# Patient Record
Sex: MALE | Race: BLACK OR AFRICAN AMERICAN | Employment: UNEMPLOYED | URBAN - METROPOLITAN AREA
[De-identification: names, ages, dates, MRNs, and addresses within clinical notes are randomized per-mention and may not be internally consistent; named-entity substitution may affect disease eponyms.]

---

## 2017-01-09 RX ORDER — METOPROLOL SUCCINATE 100 MG/1
100 TABLET, EXTENDED RELEASE ORAL DAILY
Qty: 90 TAB | Refills: 3 | Status: SHIPPED | OUTPATIENT
Start: 2017-01-09 | End: 2020-10-12 | Stop reason: DRUGHIGH

## 2017-01-09 RX ORDER — LISINOPRIL 20 MG/1
20 TABLET ORAL DAILY
Qty: 90 TAB | Refills: 3 | Status: SHIPPED | OUTPATIENT
Start: 2017-01-09 | End: 2017-04-03 | Stop reason: ALTCHOICE

## 2017-01-10 RX ORDER — AMLODIPINE BESYLATE 10 MG/1
10 TABLET ORAL DAILY
Qty: 90 TAB | Refills: 0 | Status: SHIPPED | OUTPATIENT
Start: 2017-01-10 | End: 2017-04-03 | Stop reason: ALTCHOICE

## 2017-04-03 ENCOUNTER — OFFICE VISIT (OUTPATIENT)
Dept: INTERNAL MEDICINE CLINIC | Age: 32
End: 2017-04-03

## 2017-04-03 VITALS
TEMPERATURE: 98 F | SYSTOLIC BLOOD PRESSURE: 120 MMHG | HEIGHT: 66 IN | OXYGEN SATURATION: 98 % | DIASTOLIC BLOOD PRESSURE: 74 MMHG | WEIGHT: 169 LBS | BODY MASS INDEX: 27.16 KG/M2 | RESPIRATION RATE: 20 BRPM | HEART RATE: 83 BPM

## 2017-04-03 DIAGNOSIS — F41.9 ANXIETY DISORDER, UNSPECIFIED TYPE: ICD-10-CM

## 2017-04-03 DIAGNOSIS — I61.8 OTHER LEFT-SIDED NONTRAUMATIC INTRACEREBRAL HEMORRHAGE (HCC): ICD-10-CM

## 2017-04-03 DIAGNOSIS — I10 ESSENTIAL HYPERTENSION: Primary | ICD-10-CM

## 2017-04-03 RX ORDER — ALPRAZOLAM 0.25 MG/1
0.25 TABLET ORAL
Qty: 30 TAB | Refills: 0 | Status: SHIPPED | OUTPATIENT
Start: 2017-04-03 | End: 2017-10-03 | Stop reason: ALTCHOICE

## 2017-04-03 RX ORDER — HYDRALAZINE HYDROCHLORIDE 50 MG/1
50 TABLET, FILM COATED ORAL 4 TIMES DAILY
Qty: 120 TAB | Refills: 2 | Status: SHIPPED | OUTPATIENT
Start: 2017-04-03 | End: 2017-07-02

## 2017-04-03 RX ORDER — LISINOPRIL 20 MG/1
20 TABLET ORAL DAILY
Qty: 90 TAB | Refills: 3 | Status: CANCELLED | OUTPATIENT
Start: 2017-04-03

## 2017-04-03 RX ORDER — LISINOPRIL 20 MG/1
20 TABLET ORAL DAILY
Qty: 90 TAB | Refills: 0 | Status: SHIPPED | OUTPATIENT
Start: 2017-04-03 | End: 2017-07-08 | Stop reason: SDUPTHER

## 2017-04-03 RX ORDER — AMLODIPINE BESYLATE 5 MG/1
5 TABLET ORAL DAILY
Qty: 90 TAB | Refills: 0 | Status: SHIPPED | OUTPATIENT
Start: 2017-04-03 | End: 2017-07-08 | Stop reason: SDUPTHER

## 2017-04-03 RX ORDER — CLONIDINE 0.2 MG/24H
1 PATCH, EXTENDED RELEASE TRANSDERMAL
Qty: 4 PATCH | Refills: 3 | Status: SHIPPED | OUTPATIENT
Start: 2017-04-03 | End: 2017-07-03 | Stop reason: SDUPTHER

## 2017-04-03 RX ORDER — ALPRAZOLAM 0.5 MG/1
0.5 TABLET ORAL
Qty: 30 TAB | Refills: 0 | Status: CANCELLED | OUTPATIENT
Start: 2017-04-03

## 2017-04-03 RX ORDER — CLONIDINE 0.3 MG/24H
1 PATCH, EXTENDED RELEASE TRANSDERMAL
Qty: 4 PATCH | Refills: 1 | Status: CANCELLED | OUTPATIENT
Start: 2017-04-03

## 2017-04-03 NOTE — PROGRESS NOTES
Chief Complaint   Patient presents with    Follow-up     three month follow up. 1. Have you been to the ER, urgent care clinic since your last visit? Hospitalized since your last visit? No    2. Have you seen or consulted any other health care providers outside of the [address] [place] [address] since your last visit? Include any pap smears or colon screening.  No

## 2017-04-03 NOTE — PROGRESS NOTES
Written by Troy Verde, as dictated by Dr. Berkley Larkin MD.    Alberto Mccray is a 27 y.o. male. HPI  The patient comes in today for a follow up. He has finished PT and he is walking around inside without his cane to help practice. His BP looks very good today at 120/74. He is taking all of his medications, but he no longer has insurance at this time. His insurance was through work, but it ran out and he was told he can come back for his job once he is physically stable. He no longer has a peg tube. He can eat fine and he does not have any problem with eating or difficulty swallowing. He is taking the Xanax once a day for his anxiety. He is using the clonidine patch and he changes it once a week. He is compliant on hydralazine QID. Patient Active Problem List   Diagnosis Code    [facility] I61.9    CVA (cerebrovascular accident due to intracerebral hemorrhage) ([facility] I61.9    Counseling regarding advanced directives and goals of care Z71.89    Physical debility R53.81    Anxiety F41.9    Bipolar 1 disorder ([facility] F31.9        Current Outpatient Prescriptions on File Prior to Visit   Medication Sig Dispense Refill    metoprolol succinate (TOPROL-XL) 100 mg tablet Take 1 Tab by mouth daily. 90 Tab 3    lisinopril (PRINIVIL, ZESTRIL) 20 mg tablet 1 Tab by Per G Tube route daily. 90 Tab 3    ALPRAZolam (XANAX) 0.5 mg tablet Take 0.5 mg by mouth nightly as needed.  baclofen (LIORESAL) 10 mg tablet Take 10 mg by mouth every six (6) hours as needed.  cloNIDine (CATAPRES) 0.3 mg/24 hr 1 Patch by TransDERmal route every three (3) days. 1    hydrALAZINE (APRESOLINE) 100 mg tablet 0.5 Tabs by Per G Tube route four (4) times daily. Indications: Hypertension 120 Tab 2    ergocalciferol (ERGOCALCIFEROL) 50,000 unit capsule Take 1 Cap by mouth every seven (7) days.  12 Cap 1     No current facility-administered medications on file prior to visit. No Known Allergies    Past Medical History:   Diagnosis Date    Anxiety     Chronic kidney disease     Hypertension     Stroke [facility])        Past Surgical History:   Procedure Laterality Date    HX ORTHOPAEDIC      rupture tendon    PLACE PERCUT GASTROSTOMY TUBE  1/27/2016            Family History   Problem Relation Age of Onset    Lupus Mother     Cancer Paternal Uncle     Stroke Paternal Grandmother        Social History     Social History    Marital status: SINGLE     Spouse name: N/A    Number of children: N/A    Years of education: N/A     Occupational History    Not on file. Social History Main Topics    Smoking status: Current Every Day Smoker     Packs/day: 1.00     Years: 9.00    Smokeless tobacco: Never Used    Alcohol use 1.5 oz/week     3 Standard drinks or equivalent per week      Comment: occasional    Drug use: No    Sexual activity: Yes     Other Topics Concern    Not on file     Social History Narrative           Review of Systems   Constitutional: Negative for malaise/fatigue. HENT: Negative for congestion. Respiratory: Negative for cough and wheezing. Cardiovascular: Negative for chest pain and palpitations. Musculoskeletal: Negative for joint pain. Neurological: Positive for focal weakness. Negative for dizziness, tingling, sensory change, weakness and headaches. Psychiatric/Behavioral: The patient is nervous/anxious. Visit Vitals    /74    Pulse 83    Temp 98 °F (36.7 °C) (Oral)    Resp 20    Ht 5' 6\" (1.676 m)    Wt 169 lb (76.7 kg)    SpO2 98%    BMI 27.28 kg/m2     Physical Exam   Constitutional: He is oriented to person, place, and time. He appears well-nourished. No distress. HENT:   Right Ear: External ear normal.   Left Ear: External ear normal.   Mouth/Throat: Oropharynx is clear and moist.   Eyes: Conjunctivae and EOM are normal.   Neck: Normal range of motion. Neck supple.    Cardiovascular: Normal rate and regular rhythm. Pulmonary/Chest: Effort normal and breath sounds normal. He has no wheezes. Abdominal: Soft. Bowel sounds are normal.   Neurological: He is alert and oriented to person, place, and time. Coordination abnormal.   + residual weakness   Psychiatric: He has a normal mood and affect. Nursing note and vitals reviewed. ASSESSMENT and PLAN    ICD-10-CM ICD-9-CM    1. Essential hypertension I10 401.9 amLODIPine (NORVASC) 5 mg tablet sent to pharmacy. lisinopril (PRINIVIL, ZESTRIL) 20 mg tablet sent to pharmacy. cloNIDine (CATAPRES) 0.2 mg/24 hr patch sent to pharmacy. hydrALAZINE (APRESOLINE) 50 mg tablet sent to pharmacy. I am going to lower his amlodipine from 10 mg to 5 mg and I want him to continue checking his BP everyday. I discussed that I am going to try to cut down on some of his medications. I decreased his clonidine patch dose as well. If his BP is over 150/90 then he needs to let me know. 2. Other left-sided nontraumatic intracerebral hemorrhage (HCC) I61.8 431 Follows with Dr. Elmer Cobb (neuro). 3. Anxiety disorder, unspecified type F41.9 300.00 ALPRAZolam (XANAX) 0.25 mg tablet script given to patient. I discussed that he does not need to take the xanax everyday, and he should just take it as needed. This plan was reviewed with the patient and patient agrees. All questions were answered. This scribe documentation was reviewed by me and accurately reflects the examination and decisions made by me. This note will not be viewable in [address]

## 2017-04-03 NOTE — MR AVS SNAPSHOT
Visit Information Date & Time Provider Department Dept. Phone Encounter #  
 4/3/2017 11:30 AM [facility] [phone] [id number] Upcoming Health Maintenance Date Due DTaP/Tdap/Td series (1 - Tdap) 3/17/2004 INFLUENZA AGE 9 TO ADULT 8/1/2016 Allergies as of 4/3/2017  Review Complete On: 4/3/2017 By: Madhav Wheat MD  
 No Known Allergies Current Immunizations  Never Reviewed Name Date Influenza Vaccine PF 2/4/2016  9:22 AM  
 Pneumococcal Polysaccharide (PPSV-23) 2/15/2016  1:15 PM  
  
 Not reviewed this visit You Were Diagnosed With   
  
 Codes Comments Essential hypertension    -  Primary ICD-10-CM: I10 
ICD-9-CM: 401.9 Other left-sided nontraumatic intracerebral hemorrhage (HCC)     ICD-10-CM: I61.8 ICD-9-CM: 178 Anxiety disorder, unspecified type     ICD-10-CM: F41.9 ICD-9-CM: 300.00 Vitals BP Pulse Temp Resp Height(growth percentile) Weight(growth percentile) 120/74 83 98 °F (36.7 °C) (Oral) 20 5' 6\" (1.676 m) 169 lb (76.7 kg) SpO2 BMI Smoking Status 98% 27.28 kg/m2 Current Every Day Smoker BMI and BSA Data Body Mass Index Body Surface Area  
 27.28 kg/m 2 1.89 m 2 Preferred Pharmacy Pharmacy Name Phone [facility]PH[facility]ACY [id number] [address] [phone] Your Updated Medication List  
  
   
This list is accurate as of: 4/3/17 12:00 PM.  Always use your most recent med list.  
  
  
  
  
 ALPRAZolam 0.25 mg tablet Commonly known as:  Eunice McClave Take 1 Tab by mouth nightly as needed for Anxiety for up to 30 doses. Max Daily Amount: 0.25 mg. amLODIPine 5 mg tablet Commonly known as:  Anya Medici Take 1 Tab by mouth daily for 90 days. baclofen 10 mg tablet Commonly known as:  LIORESAL Take 10 mg by mouth every six (6) hours as needed. cloNIDine 0.2 mg/24 hr patch Commonly known as:  CATAPRES  
 1 Patch by TransDERmal route every seven (7) days for 90 days. ergocalciferol 50,000 unit capsule Commonly known as:  ERGOCALCIFEROL Take 1 Cap by mouth every seven (7) days. hydrALAZINE 50 mg tablet Commonly known as:  APRESOLINE Take 1 Tab by mouth four (4) times daily for 90 days. lisinopril 20 mg tablet Commonly known as:  Iona Moulder Take 1 Tab by mouth daily for 90 days. metoprolol succinate 100 mg tablet Commonly known as:  TOPROL-XL Take 1 Tab by mouth daily. Prescriptions Printed Refills ALPRAZolam (XANAX) 0.25 mg tablet 0 Sig: Take 1 Tab by mouth nightly as needed for Anxiety for up to 30 doses. Max Daily Amount: 0.25 mg.  
 Class: Print Route: Oral  
  
Prescriptions Sent to Pharmacy Refills  
 amLODIPine (NORVASC) 5 mg tablet 0 Sig: Take 1 Tab by mouth daily for 90 days. Class: Normal  
 Pharmacy: [facility]/pharmacy #[id number] [facility], [address] Ph #: [phone] Route: Oral  
 lisinopril (PRINIVIL, ZESTRIL) 20 mg tablet 0 Sig: Take 1 Tab by mouth daily for 90 days. Class: Normal  
 Pharmacy: [facility]/pharmacy #[id number] [facility], [address] Ph #: [phone] Route: Oral  
 cloNIDine (CATAPRES) 0.2 mg/24 hr patch 3 Si Patch by TransDERmal route every seven (7) days for 90 days. Class: Normal  
 Pharmacy: [facility]/pharmacy #[id number] [facility], [address] Ph #: [phone] Route: TransDERmal  
 hydrALAZINE (APRESOLINE) 50 mg tablet 2 Sig: Take 1 Tab by mouth four (4) times daily for 90 days. Class: Normal  
 Pharmacy: [facility]/pharmacy #[id number] [facility], [address] Ph #: [phone] Route: Oral  
  
Introducing [facility]!    
 Lobito Aldridge introduces [facility] patient portal. Now you can access parts of your medical record, email your doctor's office, and request medication refills online. 1. In your internet browser, go to [email or web address] 2. Click on the First Time User? Click Here link in the Sign In box. You will see the New Member Sign Up page. 3. Enter your [email or web address] Access Code exactly as it appears below. You will not need to use this code after youve completed the sign-up process. If you do not sign up before the expiration date, you must request a new code. · [email or web address] Access Code: [email or web address] Expires: 7/2/2017 12:00 PM 
 
4. Enter the last four digits of your Social Security Number (xxxx) and Date of Birth (mm/dd/yyyy) as indicated and click Submit. You will be taken to the next sign-up page. 5. Create a [email or web address] ID. This will be your [email or web address] login ID and cannot be changed, so think of one that is secure and easy to remember. 6. Create a [email or web address] password. You can change your password at any time. 7. Enter your Password Reset Question and Answer. This can be used at a later time if you forget your password. 8. Enter your e-mail address. You will receive e-mail notification when new information is available in [address] 9. Click Sign Up. You can now view and download portions of your medical record. 10. Click the Download Summary menu link to download a portable copy of your medical information. If you have questions, please visit the Frequently Asked Questions section of the [email or web address] website. Remember, [email or web address] is NOT to be used for urgent needs. For medical emergencies, dial 911. Now available from your iPhone and Android! Please provide this summary of care documentation to your next provider. Your primary care clinician is listed as Rosemary Castro. If you have any questions after today's visit, please call [phone].

## 2017-04-13 RX ORDER — ERGOCALCIFEROL 1.25 MG/1
50000 CAPSULE ORAL
Qty: 12 CAP | Refills: 1 | Status: SHIPPED | OUTPATIENT
Start: 2017-04-13 | End: 2020-03-06 | Stop reason: ALTCHOICE

## 2017-05-22 DIAGNOSIS — F32.A DEPRESSION, UNSPECIFIED DEPRESSION TYPE: ICD-10-CM

## 2017-05-22 RX ORDER — ESCITALOPRAM OXALATE 10 MG/1
TABLET ORAL
Qty: 30 TAB | Refills: 0 | Status: SHIPPED | OUTPATIENT
Start: 2017-05-22 | End: 2017-07-03 | Stop reason: ALTCHOICE

## 2017-06-19 ENCOUNTER — HOSPITAL ENCOUNTER (EMERGENCY)
Age: 32
Discharge: HOME | End: 2017-06-19
Attending: EMERGENCY MEDICINE | Admitting: EMERGENCY MEDICINE
Payer: SELF-PAY

## 2017-06-19 ENCOUNTER — APPOINTMENT (OUTPATIENT)
Dept: CT IMAGING | Age: 32
End: 2017-06-19
Attending: EMERGENCY MEDICINE
Payer: SELF-PAY

## 2017-06-19 VITALS
DIASTOLIC BLOOD PRESSURE: 107 MMHG | WEIGHT: 165 LBS | SYSTOLIC BLOOD PRESSURE: 154 MMHG | RESPIRATION RATE: 15 BRPM | TEMPERATURE: 97.9 F | HEIGHT: 66 IN | BODY MASS INDEX: 26.52 KG/M2 | HEART RATE: 64 BPM | OXYGEN SATURATION: 100 %

## 2017-06-19 DIAGNOSIS — I95.1 ORTHOSTATIC HYPOTENSION: Primary | ICD-10-CM

## 2017-06-19 LAB
ALBUMIN SERPL BCP-MCNC: 3.6 G/DL (ref 3.5–5)
ALBUMIN/GLOB SERPL: 0.9 {RATIO} (ref 1.1–2.2)
ALP SERPL-CCNC: 85 U/L (ref 45–117)
ALT SERPL-CCNC: 17 U/L (ref 12–78)
ANION GAP BLD CALC-SCNC: 6 MMOL/L (ref 5–15)
APPEARANCE UR: CLEAR
AST SERPL W P-5'-P-CCNC: 15 U/L (ref 15–37)
BACTERIA URNS QL MICRO: NEGATIVE /HPF
BASOPHILS # BLD AUTO: 0.1 K/UL (ref 0–0.1)
BASOPHILS # BLD: 1 % (ref 0–1)
BILIRUB SERPL-MCNC: 0.6 MG/DL (ref 0.2–1)
BILIRUB UR QL CFM: NEGATIVE
BUN SERPL-MCNC: 16 MG/DL (ref 6–20)
BUN/CREAT SERPL: 12 (ref 12–20)
CALCIUM SERPL-MCNC: 9.1 MG/DL (ref 8.5–10.1)
CHLORIDE SERPL-SCNC: 104 MMOL/L (ref 97–108)
CK SERPL-CCNC: 61 U/L (ref 39–308)
CO2 SERPL-SCNC: 27 MMOL/L (ref 21–32)
COLOR UR: ABNORMAL
CREAT SERPL-MCNC: 1.33 MG/DL (ref 0.7–1.3)
EOSINOPHIL # BLD: 0.7 K/UL (ref 0–0.4)
EOSINOPHIL NFR BLD: 7 % (ref 0–7)
EPITH CASTS URNS QL MICRO: ABNORMAL /LPF
ERYTHROCYTE [DISTWIDTH] IN BLOOD BY AUTOMATED COUNT: 13.9 % (ref 11.5–14.5)
GLOBULIN SER CALC-MCNC: 4 G/DL (ref 2–4)
GLUCOSE SERPL-MCNC: 80 MG/DL (ref 65–100)
GLUCOSE UR STRIP.AUTO-MCNC: NEGATIVE MG/DL
HCT VFR BLD AUTO: 48.4 % (ref 36.6–50.3)
HGB BLD-MCNC: 16.9 G/DL (ref 12.1–17)
HGB UR QL STRIP: NEGATIVE
KETONES UR QL STRIP.AUTO: ABNORMAL MG/DL
LEUKOCYTE ESTERASE UR QL STRIP.AUTO: NEGATIVE
LYMPHOCYTES # BLD AUTO: 29 % (ref 12–49)
LYMPHOCYTES # BLD: 2.7 K/UL (ref 0.8–3.5)
MCH RBC QN AUTO: 32.7 PG (ref 26–34)
MCHC RBC AUTO-ENTMCNC: 34.9 G/DL (ref 30–36.5)
MCV RBC AUTO: 93.6 FL (ref 80–99)
MONOCYTES # BLD: 0.5 K/UL (ref 0–1)
MONOCYTES NFR BLD AUTO: 5 % (ref 5–13)
NEUTS SEG # BLD: 5.5 K/UL (ref 1.8–8)
NEUTS SEG NFR BLD AUTO: 58 % (ref 32–75)
NITRITE UR QL STRIP.AUTO: NEGATIVE
PH UR STRIP: 5.5 [PH] (ref 5–8)
PLATELET # BLD AUTO: 269 K/UL (ref 150–400)
POTASSIUM SERPL-SCNC: 4.1 MMOL/L (ref 3.5–5.1)
PROT SERPL-MCNC: 7.6 G/DL (ref 6.4–8.2)
PROT UR STRIP-MCNC: NEGATIVE MG/DL
RBC # BLD AUTO: 5.17 M/UL (ref 4.1–5.7)
RBC #/AREA URNS HPF: ABNORMAL /HPF (ref 0–5)
SODIUM SERPL-SCNC: 137 MMOL/L (ref 136–145)
SP GR UR REFRACTOMETRY: 1.03 (ref 1–1.03)
TROPONIN I SERPL-MCNC: <0.04 NG/ML
UA: UC IF INDICATED,UAUC: ABNORMAL
UROBILINOGEN UR QL STRIP.AUTO: 1 EU/DL (ref 0.2–1)
WBC # BLD AUTO: 9.4 K/UL (ref 4.1–11.1)
WBC URNS QL MICRO: ABNORMAL /HPF (ref 0–4)

## 2017-06-19 PROCEDURE — 93005 ELECTROCARDIOGRAM TRACING: CPT

## 2017-06-19 PROCEDURE — 85025 COMPLETE CBC W/AUTO DIFF WBC: CPT | Performed by: EMERGENCY MEDICINE

## 2017-06-19 PROCEDURE — 74011250636 HC RX REV CODE- 250/636: Performed by: EMERGENCY MEDICINE

## 2017-06-19 PROCEDURE — 84484 ASSAY OF TROPONIN QUANT: CPT | Performed by: EMERGENCY MEDICINE

## 2017-06-19 PROCEDURE — 82550 ASSAY OF CK (CPK): CPT | Performed by: EMERGENCY MEDICINE

## 2017-06-19 PROCEDURE — 87086 URINE CULTURE/COLONY COUNT: CPT | Performed by: EMERGENCY MEDICINE

## 2017-06-19 PROCEDURE — 87077 CULTURE AEROBIC IDENTIFY: CPT | Performed by: EMERGENCY MEDICINE

## 2017-06-19 PROCEDURE — 99285 EMERGENCY DEPT VISIT HI MDM: CPT

## 2017-06-19 PROCEDURE — 70450 CT HEAD/BRAIN W/O DYE: CPT

## 2017-06-19 PROCEDURE — 96360 HYDRATION IV INFUSION INIT: CPT

## 2017-06-19 PROCEDURE — 36415 COLL VENOUS BLD VENIPUNCTURE: CPT | Performed by: EMERGENCY MEDICINE

## 2017-06-19 PROCEDURE — 87186 SC STD MICRODIL/AGAR DIL: CPT | Performed by: EMERGENCY MEDICINE

## 2017-06-19 PROCEDURE — 80053 COMPREHEN METABOLIC PANEL: CPT | Performed by: EMERGENCY MEDICINE

## 2017-06-19 PROCEDURE — 96361 HYDRATE IV INFUSION ADD-ON: CPT

## 2017-06-19 PROCEDURE — 81001 URINALYSIS AUTO W/SCOPE: CPT | Performed by: EMERGENCY MEDICINE

## 2017-06-19 RX ADMIN — SODIUM CHLORIDE 500 ML: 900 INJECTION, SOLUTION INTRAVENOUS at 21:31

## 2017-06-19 NOTE — ED TRIAGE NOTES
Patient arrives to ED via wheelchair with complaint of dizziness when standing, onset this afternoon. Patient denies chest pain or shortness of breath; denies N/V/D. Patient has history of stroke and brain bleed.

## 2017-06-20 LAB
ATRIAL RATE: 63 BPM
CALCULATED P AXIS, ECG09: 59 DEGREES
CALCULATED R AXIS, ECG10: 52 DEGREES
CALCULATED T AXIS, ECG11: 24 DEGREES
DIAGNOSIS, 93000: NORMAL
P-R INTERVAL, ECG05: 178 MS
Q-T INTERVAL, ECG07: 416 MS
QRS DURATION, ECG06: 84 MS
QTC CALCULATION (BEZET), ECG08: 425 MS
VENTRICULAR RATE, ECG03: 63 BPM

## 2017-06-20 NOTE — DISCHARGE INSTRUCTIONS
Orthostatic Hypotension: Care Instructions  Your Care Instructions  Orthostatic hypotension is a quick drop in blood pressure. It happens when you get up from sitting or lying down. You may feel faint, lightheaded, or dizzy. When a person sits up or stands up, the body changes the way it pumps blood. This can slow the flow of blood to the brain for a very short time. And that can make you feel lightheaded. Many medicines can cause this problem, especially in older people. Lack of fluids (dehydration) or illnesses such as diabetes or heart disease also can cause it. Follow-up care is a key part of your treatment and safety. Be sure to make and go to all appointments, and call your doctor if you are having problems. It's also a good idea to know your test results and keep a list of the medicines you take. How can you care for yourself at home? · Tell your doctor about any problems you have with your medicines. · If your doctor prescribes medicine to help prevent a low blood pressure problem, take it exactly as prescribed. Call your doctor if you think you are having a problem with your medicine. · Drink plenty of fluids, enough so that your urine is light yellow or clear like water. Choose water and other caffeine-free clear liquids. If you have kidney, heart, or liver disease and have to limit fluids, talk with your doctor before you increase the amount of fluids you drink. · Limit or avoid alcohol and caffeine. · Get up slowly from bed or after sitting for a long time. If you are in bed, roll to your side and swing your legs over the edge of the bed and onto the floor. Push your body up to a sitting position. Wait for a while before you slowly stand up. If you are dizzy or lightheaded, sit or lie down. When should you call for help? Call 911 anytime you think you may need emergency care. For example, call if:  · You passed out (lost consciousness).   Watch closely for changes in your health, and be sure to contact your doctor if:  · You do not get better as expected. Where can you learn more? Go to http://mi-pepito.info/. Enter X124 in the search box to learn more about \"Orthostatic Hypotension: Care Instructions. \"  Current as of: April 3, 2017  Content Version: 11.3  © 8960-1982 [employer]. Care instructions adapted under license by [employer] (which disclaims liability or warranty for this information). If you have questions about a medical condition or this instruction, always ask your healthcare professional. [address] any warranty or liability for your use of this information.

## 2017-06-20 NOTE — ED NOTES
Dr. Linda Alvarado at bedside to provide discharge paperwork. Vital signs stable. Pt in no apparent distress at this time. Mental status at baseline. Patient wheeled to waiting room with discharge paperwork in hand. Accompanied by RN and visitors.

## 2017-06-20 NOTE — ED PROVIDER NOTES
HPI Comments: Gordon Tellez is a 27 y.o. male with PMHx significant for stroke with R sided weakness, CKD, HTN, anxiety, who presents ambulatory to [facility] ED with cc of dizziness with standing onset this afternoon after he woke up from his nap. Pt states his symptom has improved and is not experiencing any dizziness while at rest. He reports recent change in medication, but does not know which medication. Pt states he took all of his morning medication today and notes he has a Clonidine patch. He reports not eating all day today. He denies any recent worsening weakness or numbness. Pt specifically denies any recent CP, palpitations, N/V/D, or HA. PCP: Madhav Wheat MD      Social History: (+) Tobacco, (+) EtOH, (-) Illicit Drugs         There are no other complaints, changes, or physical findings at this time. The history is provided by the patient. Past Medical History:   Diagnosis Date    Anxiety     Chronic kidney disease     patient unsure    Hypertension     Stroke [facility])        Past Surgical History:   Procedure Laterality Date    HX ORTHOPAEDIC      rupture tendon    PLACE PERCUT GASTROSTOMY TUBE  1/27/2016              Family History:   Problem Relation Age of Onset    Lupus Mother     Cancer Paternal Uncle     Stroke Paternal Grandmother        Social History     Social History    Marital status: SINGLE     Spouse name: N/A    Number of children: N/A    Years of education: N/A     Occupational History    Not on file. Social History Main Topics    Smoking status: Current Every Day Smoker     Packs/day: 0.50     Years: 10.00    Smokeless tobacco: Never Used    Alcohol use 1.5 oz/week     3 Standard drinks or equivalent per week      Comment: occasional    Drug use: No    Sexual activity: Yes     Other Topics Concern    Not on file     Social History Narrative         ALLERGIES: Review of patient's allergies indicates no known allergies.     Review of Systems Constitutional: Negative for fatigue and fever. HENT: Negative. Eyes: Negative. Respiratory: Negative for shortness of breath and wheezing. Cardiovascular: Negative for chest pain and leg swelling. Gastrointestinal: Negative for blood in stool, constipation, diarrhea, nausea and vomiting. Endocrine: Negative. Genitourinary: Negative for difficulty urinating and dysuria. Musculoskeletal: Negative. Skin: Negative for rash. Allergic/Immunologic: Negative. Neurological: Positive for dizziness. Negative for weakness, numbness and headaches. Hematological: Negative. Psychiatric/Behavioral: Negative. Patient Vitals for the past 12 hrs:   Temp Pulse Resp BP SpO2   06/19/17 2130 - 65 18 (!) 159/109 100 %   06/19/17 2115 - 60 14 (!) 150/102 100 %   06/19/17 2102 - - - (!) 144/113 -   06/19/17 2045 - 65 14 (!) 126/98 100 %   06/19/17 2038 - 72 14 (!) 124/97 -   06/19/17 2036 - (!) 55 12 (!) 142/101 100 %   06/19/17 2034 - (!) 55 14 (!) 140/99 100 %   06/19/17 2030 - (!) 56 14 (!) 138/106 100 %   06/19/17 2016 - (!) 58 12 (!) 154/101 95 %   06/19/17 1958 - - - (!) 144/118 100 %   06/19/17 1910 97.9 °F (36.6 °C) 66 16 (!) 125/94 98 %       Physical Exam   Constitutional: He is oriented to person, place, and time. He appears well-developed and well-nourished. No distress. HENT:   Head: Normocephalic and atraumatic. Mouth/Throat: Oropharynx is clear and moist.   Eyes: Conjunctivae and EOM are normal.   Neck: Neck supple. No JVD present. No tracheal deviation present. Cardiovascular: Normal rate, regular rhythm and intact distal pulses. Exam reveals no gallop and no friction rub. No murmur heard. Pulmonary/Chest: Effort normal and breath sounds normal. No stridor. No respiratory distress. He has no wheezes. Lungs clear   Abdominal: Soft. Bowel sounds are normal. He exhibits no distension and no mass. There is no tenderness. There is no guarding.    Musculoskeletal: He exhibits no edema or tenderness. No deformity. Decreased ROM on flexion of the RLE, which is baseline for pt. Neurological: He is alert and oriented to person, place, and time. He has normal strength. Right facial droop, 4/5 muscle strength in RUE. 5/5 strength in BLE. All of this is baseline for the pt. Skin: Skin is warm, dry and intact. No rash noted. Psychiatric: He has a normal mood and affect. His behavior is normal. Judgment and thought content normal.   Nursing note and vitals reviewed. MDM  Number of Diagnoses or Management Options  Orthostatic hypotension:   Diagnosis management comments: Pt presenting with lightheadedness and dizziness, has a hx of CVA with residual right-sided deficits. Pt with no new focal neurologic deficits, and symptoms are improved while lying down. Low suspicion for CVA/TIA given benign neuro exam. Will get orthostatics, dehydration, yadira, electrolyte abnormality, vasovagal, anemia. Amount and/or Complexity of Data Reviewed  Clinical lab tests: ordered and reviewed  Tests in the medicine section of CPT®: ordered and reviewed  Review and summarize past medical records: yes  Independent visualization of images, tracings, or specimens: yes    Patient Progress  Patient progress: stable    ED Course       Procedures    EKG interpretation: (Preliminary) 19:14  Rhythm: normal sinus rhythm; and regular . Rate (approx.): 63; Axis: normal; AR interval: normal; QRS interval: normal ; ST/T wave: normal.  Written by Revonda Snellen, ED Scribe, as dictated by Lizzette Tate DO. PROGRESS NOTE  8:40 PM   Pt is orthostatic. Written by Revonda Snellen, ED Scribe, as dictated by Lizzette Tate DO. PROGRESS NOTE  9:58 PM   Pt ambulated. He states his dizziness has resolved.    Written by Revonda Snellen, ED Scribe, as dictated by Lizzette Tate DO.      LABORATORY TESTS:  Recent Results (from the past 12 hour(s))   EKG, 12 LEAD, INITIAL    Collection Time: 06/19/17  7:14 PM   Result Value Ref Range    Ventricular Rate 63 BPM    Atrial Rate 63 BPM    P-R Interval 178 ms    QRS Duration 84 ms    Q-T Interval 416 ms    QTC Calculation (Bezet) 425 ms    Calculated P Axis 59 degrees    Calculated R Axis 52 degrees    Calculated T Axis 24 degrees    Diagnosis       Normal sinus rhythm  Normal ECG  When compared with ECG of 17-JAN-2016 15:43,  Vent. rate has decreased BY  31 BPM  T wave inversion no longer evident in Lateral leads  QT has shortened     CBC WITH AUTOMATED DIFF    Collection Time: 06/19/17  7:59 PM   Result Value Ref Range    WBC 9.4 4.1 - 11.1 K/uL    RBC 5.17 4.10 - 5.70 M/uL    HGB 16.9 12.1 - 17.0 g/dL    HCT 48.4 36.6 - 50.3 %    MCV 93.6 80.0 - 99.0 FL    MCH 32.7 26.0 - 34.0 PG    MCHC 34.9 30.0 - 36.5 g/dL    RDW 13.9 11.5 - 14.5 %    PLATELET 327 621 - 051 K/uL    NEUTROPHILS 58 32 - 75 %    LYMPHOCYTES 29 12 - 49 %    MONOCYTES 5 5 - 13 %    EOSINOPHILS 7 0 - 7 %    BASOPHILS 1 0 - 1 %    ABS. NEUTROPHILS 5.5 1.8 - 8.0 K/UL    ABS. LYMPHOCYTES 2.7 0.8 - 3.5 K/UL    ABS. MONOCYTES 0.5 0.0 - 1.0 K/UL    ABS. EOSINOPHILS 0.7 (H) 0.0 - 0.4 K/UL    ABS. BASOPHILS 0.1 0.0 - 0.1 K/UL   METABOLIC PANEL, COMPREHENSIVE    Collection Time: 06/19/17  7:59 PM   Result Value Ref Range    Sodium 137 136 - 145 mmol/L    Potassium 4.1 3.5 - 5.1 mmol/L    Chloride 104 97 - 108 mmol/L    CO2 27 21 - 32 mmol/L    Anion gap 6 5 - 15 mmol/L    Glucose 80 65 - 100 mg/dL    BUN 16 6 - 20 MG/DL    Creatinine 1.33 (H) 0.70 - 1.30 MG/DL    BUN/Creatinine ratio 12 12 - 20      GFR est AA >60 >60 ml/min/1.73m2    GFR est non-AA >60 >60 ml/min/1.73m2    Calcium 9.1 8.5 - 10.1 MG/DL    Bilirubin, total 0.6 0.2 - 1.0 MG/DL    ALT (SGPT) 17 12 - 78 U/L    AST (SGOT) 15 15 - 37 U/L    Alk.  phosphatase 85 45 - 117 U/L    Protein, total 7.6 6.4 - 8.2 g/dL    Albumin 3.6 3.5 - 5.0 g/dL    Globulin 4.0 2.0 - 4.0 g/dL    A-G Ratio 0.9 (L) 1.1 - 2.2     CK W/ REFLX CKMB    Collection Time: 06/19/17  7:59 PM   Result Value Ref Range    CK 61 39 - 308 U/L   TROPONIN I    Collection Time: 06/19/17  7:59 PM   Result Value Ref Range    Troponin-I, Qt. <0.04 <0.05 ng/mL   URINALYSIS W/ REFLEX CULTURE    Collection Time: 06/19/17  9:21 PM   Result Value Ref Range    Color DARK YELLOW      Appearance CLEAR CLEAR      Specific gravity 1.026 1.003 - 1.030      pH (UA) 5.5 5.0 - 8.0      Protein NEGATIVE  NEG mg/dL    Glucose NEGATIVE  NEG mg/dL    Ketone TRACE (A) NEG mg/dL    Blood NEGATIVE  NEG      Urobilinogen 1.0 0.2 - 1.0 EU/dL    Nitrites NEGATIVE  NEG      Leukocyte Esterase NEGATIVE  NEG      WBC 5-10 0 - 4 /hpf    RBC 5-10 0 - 5 /hpf    Epithelial cells FEW FEW /lpf    Bacteria NEGATIVE  NEG /hpf    UA:UC IF INDICATED URINE CULTURE ORDERED (A) CNI     BILIRUBIN, CONFIRM    Collection Time: 06/19/17  9:21 PM   Result Value Ref Range    Bilirubin UA, confirm NEGATIVE  NEG         IMAGING RESULTS:  CT HEAD WO CONT   Final Result   INDICATION: dizziness, hx of cva      Exam: Noncontrast CT of the brain is performed with 5 mm collimation.     CT dose reduction was achieved with the use of the standardized protocol  tailored for this examination and automatic exposure control for dose  modulation.     Direct comparison is made to prior CT dated January 2016.     FINDINGS: There is no acute intracranial hemorrhage, mass, mass effect or  herniation. There has been interval resolution of the left basal ganglia  intraparenchymal hemorrhage seen on prior CT dated January 2016. Ventricular  system is normal. There is no evidence of acute territorial infarct. The mastoid  air cells are well pneumatized. The visualized paranasal sinuses are normal.     IMPRESSION  IMPRESSION: No acute intracranial hemorrhage, mass or infarct.            MEDICATIONS GIVEN:  Medications   sodium chloride 0.9 % bolus infusion 500 mL (500 mL IntraVENous New Bag 6/19/17 3830)       IMPRESSION:  1. Orthostatic hypotension        PLAN:  1.    Current Discharge Medication List        2. Follow-up Information     Follow up With Details Comments [address], [place] Schedule an appointment as soon as possible for a visit  [address] [facility]  [address]  [phone]      [facility] EMERGENCY DEPT  As needed, If symptoms worsen [address]  [address] [facility]  [phone]        Return to ED if worse     Discharge Note:  10:00 PM  The pt is ready for discharge. The pt's signs, symptoms, diagnosis, and discharge instructions have been discussed and pt has conveyed their understanding. The pt is to follow up as recommended or return to ER should their symptoms worsen. Plan has been discussed and pt is in agreement. This note is prepared by Dustin Essex and Terence Menezes, acting as a Scribes for Moses Trujillo DO. Moses Trujillo DO: The scribe's documentation has been prepared under my direction and personally reviewed by me in its entirety. I confirm that the notes above accurately reflects all work, treatment, procedures, and medical decision making performed by me.

## 2017-06-20 NOTE — ED NOTES
Dr. Nevaeh Carrero reviewed discharge instructions with the patient. The patient verbalized understanding.

## 2017-06-21 ENCOUNTER — DOCUMENTATION ONLY (OUTPATIENT)
Dept: INTERNAL MEDICINE CLINIC | Age: 32
End: 2017-06-21

## 2017-06-21 NOTE — PROGRESS NOTES
Prior auth for metoprolol is requested on script written 1/9/2017. Patient had office visit and put on other medication and medication is not needed.   Faxed denial to [facility] pharmacy request sent to scan

## 2017-06-22 LAB
BACTERIA SPEC CULT: ABNORMAL
CC UR VC: ABNORMAL
SERVICE CMNT-IMP: ABNORMAL

## 2017-06-22 RX ORDER — CIPROFLOXACIN 500 MG/1
500 TABLET ORAL 2 TIMES DAILY
Qty: 6 TAB | Refills: 0 | Status: SHIPPED | OUTPATIENT
Start: 2017-06-22 | End: 2017-06-25

## 2017-06-22 NOTE — PROGRESS NOTES
Contacted patient, mild symptoms noted.   Cipro 500mg BID x 3 days efiled to the [facility] at his request.

## 2017-06-30 ENCOUNTER — TELEPHONE (OUTPATIENT)
Dept: INTERNAL MEDICINE CLINIC | Age: 32
End: 2017-06-30

## 2017-06-30 NOTE — TELEPHONE ENCOUNTER
Patient has [employer] and medication for clonidine 0.2 patch is not covered. This medication would be $210.00  Web site does not offer prior auth to fill out. Do you want to use an alternative?

## 2017-07-03 ENCOUNTER — OFFICE VISIT (OUTPATIENT)
Dept: INTERNAL MEDICINE CLINIC | Age: 32
End: 2017-07-03

## 2017-07-03 VITALS
SYSTOLIC BLOOD PRESSURE: 122 MMHG | DIASTOLIC BLOOD PRESSURE: 78 MMHG | RESPIRATION RATE: 14 BRPM | OXYGEN SATURATION: 97 % | TEMPERATURE: 98 F | HEART RATE: 81 BPM | BODY MASS INDEX: 25.23 KG/M2 | HEIGHT: 66 IN | WEIGHT: 157 LBS

## 2017-07-03 DIAGNOSIS — Z86.73 HISTORY OF CVA (CEREBROVASCULAR ACCIDENT): ICD-10-CM

## 2017-07-03 DIAGNOSIS — I10 ESSENTIAL HYPERTENSION: Primary | ICD-10-CM

## 2017-07-03 DIAGNOSIS — R42 POSTURAL DIZZINESS: ICD-10-CM

## 2017-07-03 DIAGNOSIS — Z72.0 TOBACCO ABUSE: ICD-10-CM

## 2017-07-03 DIAGNOSIS — I10 ESSENTIAL HYPERTENSION: ICD-10-CM

## 2017-07-03 DIAGNOSIS — F41.9 ANXIETY DISORDER, UNSPECIFIED TYPE: ICD-10-CM

## 2017-07-03 RX ORDER — ESCITALOPRAM OXALATE 10 MG/1
10 TABLET ORAL DAILY
Qty: 90 TAB | Refills: 0 | Status: SHIPPED | OUTPATIENT
Start: 2017-07-03 | End: 2017-10-01

## 2017-07-03 RX ORDER — LISINOPRIL 20 MG/1
20 TABLET ORAL DAILY
Qty: 30 TAB | Refills: 2 | Status: SHIPPED | OUTPATIENT
Start: 2017-07-03 | End: 2018-11-28 | Stop reason: SDUPTHER

## 2017-07-03 RX ORDER — CLONIDINE 0.2 MG/24H
1 PATCH, EXTENDED RELEASE TRANSDERMAL
Qty: 4 PATCH | Refills: 3 | Status: SHIPPED | OUTPATIENT
Start: 2017-07-03 | End: 2017-10-01

## 2017-07-03 RX ORDER — HYDRALAZINE HYDROCHLORIDE 50 MG/1
50 TABLET, FILM COATED ORAL 4 TIMES DAILY
Qty: 120 TAB | Refills: 1 | Status: SHIPPED | OUTPATIENT
Start: 2017-07-03 | End: 2017-09-27 | Stop reason: SDUPTHER

## 2017-07-03 NOTE — PROGRESS NOTES
Written by Mychal Carlos, as dictated by Dr. César Worthy MD.    Reinaldo Piedra is a 27 y.o. male. HPI  The patient comes in today for a 3-month follow-up. His BP is normal today at 122/78. He checks at home and the readings have been reading high, coming back around 144/90. He is concerned that his home machine is not accurate. He denies headaches. He has not followed with neurology since early this year. He is compliant on the clonodine patch, hydralazine QID, Xanax prn, and lexapro. He sleeps sporadically. He meets with a stroke club at [address] [place] [address] twice a week. He lives with his father and cooks for himself. His LDL was elevated at 119 in 2016, and his creatinine was elevated at 1.33 on . He visited the ED on  after he woke up from a nap experiencing dizziness. He had a head CT in the hospital which showed No acute intracranial hemorrhage, mass or infarct. The ED told him it was likely a result of not eating or drinking. He does not drink enough water and eats about 1 meal per day. He has had a productive cough since -. He started smoking again. No fever or chills. Patient Active Problem List   Diagnosis Code    ICH (intracerebral hemorrhage) ([facility] I61.9    CVA (cerebrovascular accident due to intracerebral hemorrhage) ([facility] [address] I61.9    Counseling regarding advanced directives and goals of care Z71.89    Physical debility R53.81    Anxiety F41.9    Bipolar 1 disorder ([facility] F31.9        Current Outpatient Prescriptions on File Prior to Visit   Medication Sig Dispense Refill    ergocalciferol (ERGOCALCIFEROL) 50,000 unit capsule Take 1 Cap by mouth every seven (7) days. 12 Cap 1    ALPRAZolam (XANAX) 0.25 mg tablet Take 1 Tab by mouth nightly as needed for Anxiety for up to 30 doses. Max Daily Amount: 0.25 mg. 30 Tab 0    metoprolol succinate (TOPROL-XL) 100 mg tablet Take 1 Tab by mouth daily.  90 Tab 3    [] amLODIPine (NORVASC) 5 mg tablet Take 1 Tab by mouth daily for 90 days. 90 Tab 0    [] lisinopril (PRINIVIL, ZESTRIL) 20 mg tablet Take 1 Tab by mouth daily for 90 days. 90 Tab 0    [] cloNIDine (CATAPRES) 0.2 mg/24 hr patch 1 Patch by TransDERmal route every seven (7) days for 90 days. 4 Patch 3    [] hydrALAZINE (APRESOLINE) 50 mg tablet Take 1 Tab by mouth four (4) times daily for 90 days. 120 Tab 2     No current facility-administered medications on file prior to visit. No Known Allergies    Past Medical History:   Diagnosis Date    Anxiety     Chronic kidney disease     patient unsure    Hypertension     Stroke [facility])        Past Surgical History:   Procedure Laterality Date    HX ORTHOPAEDIC      rupture tendon    PLACE PERCUT GASTROSTOMY TUBE  2016            Family History   Problem Relation Age of Onset    Lupus Mother     Cancer Paternal Uncle     Stroke Paternal Grandmother        Social History     Social History    Marital status: SINGLE     Spouse name: N/A    Number of children: N/A    Years of education: N/A     Occupational History    Not on file.      Social History Main Topics    Smoking status: Current Every Day Smoker     Packs/day: 0.50     Years: 10.00    Smokeless tobacco: Never Used    Alcohol use 1.5 oz/week     3 Standard drinks or equivalent per week      Comment: occasional    Drug use: No    Sexual activity: Yes     Other Topics Concern    Not on file     Social History Narrative       Admission on 2017, Discharged on 2017   Component Date Value Ref Range Status    Ventricular Rate 2017 63  BPM Final    Atrial Rate 2017 63  BPM Final    P-R Interval 2017 178  ms Final    QRS Duration 2017 84  ms Final    Q-T Interval 2017 416  ms Final    QTC Calculation (Bezet) 2017 425  ms Final    Calculated P Axis 2017 59  degrees Final    Calculated R Axis 2017 52  degrees Final    Calculated T Axis 06/19/2017 24  degrees Final    Diagnosis 06/19/2017    Final                    Value:Normal sinus rhythm  Normal ECG  When compared with ECG of 17-JAN-2016 15:43,  Vent. rate has decreased BY  31 BPM  T wave inversion no longer evident in Lateral leads  QT has shortened  Confirmed by Lavonne Reese ([id number]) on 6/20/2017 9:53:32 AM      WBC 06/19/2017 9.4  4.1 - 11.1 K/uL Final    RBC 06/19/2017 5.17  4.10 - 5.70 M/uL Final    HGB 06/19/2017 16.9  12.1 - 17.0 g/dL Final    HCT 06/19/2017 48.4  36.6 - 50.3 % Final    MCV 06/19/2017 93.6  80.0 - 99.0 FL Final    MCH 06/19/2017 32.7  26.0 - 34.0 PG Final    MCHC 06/19/2017 34.9  30.0 - 36.5 g/dL Final    RDW 06/19/2017 13.9  11.5 - 14.5 % Final    PLATELET 11/38/2735 383  150 - 400 K/uL Final    NEUTROPHILS 06/19/2017 58  32 - 75 % Final    LYMPHOCYTES 06/19/2017 29  12 - 49 % Final    MONOCYTES 06/19/2017 5  5 - 13 % Final    EOSINOPHILS 06/19/2017 7  0 - 7 % Final    BASOPHILS 06/19/2017 1  0 - 1 % Final    ABS. NEUTROPHILS 06/19/2017 5.5  1.8 - 8.0 K/UL Final    ABS. LYMPHOCYTES 06/19/2017 2.7  0.8 - 3.5 K/UL Final    ABS. MONOCYTES 06/19/2017 0.5  0.0 - 1.0 K/UL Final    ABS. EOSINOPHILS 06/19/2017 0.7* 0.0 - 0.4 K/UL Final    ABS.  BASOPHILS 06/19/2017 0.1  0.0 - 0.1 K/UL Final    Sodium 06/19/2017 137  136 - 145 mmol/L Final    Potassium 06/19/2017 4.1  3.5 - 5.1 mmol/L Final    Chloride 06/19/2017 104  97 - 108 mmol/L Final    CO2 06/19/2017 27  21 - 32 mmol/L Final    Anion gap 06/19/2017 6  5 - 15 mmol/L Final    Glucose 06/19/2017 80  65 - 100 mg/dL Final    BUN 06/19/2017 16  6 - 20 MG/DL Final    Creatinine 06/19/2017 1.33* 0.70 - 1.30 MG/DL Final    BUN/Creatinine ratio 06/19/2017 12  12 - 20   Final    GFR est AA 06/19/2017 >60  >60 ml/min/1.73m2 Final    GFR est non-AA 06/19/2017 >60  >60 ml/min/1.73m2 Final    Comment: Estimated GFR is calculated using the IDMS-traceable Modification of Diet in Renal Disease (MDRD) Study equation, reported for both  Americans (GFRAA) and non- Americans (GFRNA), and normalized to 1.73m2 body surface area. The physician must decide which value applies to the patient. The MDRD study equation should only be used in individuals age 25 or older. It has not been validated for the following: pregnant women, patients with serious comorbid conditions, or on certain medications, or persons with extremes of body size, muscle mass, or nutritional status.  Calcium 06/19/2017 9.1  8.5 - 10.1 MG/DL Final    Bilirubin, total 06/19/2017 0.6  0.2 - 1.0 MG/DL Final    ALT (SGPT) 06/19/2017 17  12 - 78 U/L Final    AST (SGOT) 06/19/2017 15  15 - 37 U/L Final    Alk. phosphatase 06/19/2017 85  45 - 117 U/L Final    Protein, total 06/19/2017 7.6  6.4 - 8.2 g/dL Final    Albumin 06/19/2017 3.6  3.5 - 5.0 g/dL Final    Globulin 06/19/2017 4.0  2.0 - 4.0 g/dL Final    A-G Ratio 06/19/2017 0.9* 1.1 - 2.2   Final    CK 06/19/2017 61  39 - 308 U/L Final    NOT ELEVATED,CKMB-QUANT NOT PERFORMED    Troponin-I, Qt. 06/19/2017 <0.04  <0.05 ng/mL Final    Comment: The presence of detectable troponin above the reference range indicates myocardial injury which may be due to ischemia, myocarditis, trauma, etc.  Clinical correlation is necessary to establish the significance of this finding. Sequential testing is recommended to determine if the typical rise and fall of cTnI is demonstrated. Note:  Cardiac troponin I has a relatively long half life and may be present well after the CK MB has returned to baseline. The reference range is based on the 99th percentile of the referent population.       Color 06/19/2017 DARK YELLOW    Final    Color Reference Range: Straw, Yellow or Dark Yellow    Appearance 06/19/2017 CLEAR  CLEAR   Final    Specific gravity 06/19/2017 1.026  1.003 - 1.030   Final    pH (UA) 06/19/2017 5.5  5.0 - 8.0   Final    Protein 06/19/2017 NEGATIVE   NEG mg/dL Final    Glucose 06/19/2017 NEGATIVE   NEG mg/dL Final    Ketone 06/19/2017 TRACE* NEG mg/dL Final    Blood 06/19/2017 NEGATIVE   NEG   Final    Urobilinogen 06/19/2017 1.0  0.2 - 1.0 EU/dL Final    Nitrites 06/19/2017 NEGATIVE   NEG   Final    Leukocyte Esterase 06/19/2017 NEGATIVE   NEG   Final    WBC 06/19/2017 5-10  0 - 4 /hpf Final    RBC 06/19/2017 5-10  0 - 5 /hpf Final    Epithelial cells 06/19/2017 FEW  FEW /lpf Final    Epithelial cell category consists of squamous cells and /or transitional urothelial cells. Renal tubular cells, if present, are separately identified as such.  Bacteria 06/19/2017 NEGATIVE   NEG /hpf Final    UA:UC IF INDICATED 06/19/2017 URINE CULTURE ORDERED* CNI   Final    Bilirubin UA, confirm 06/19/2017 NEGATIVE   NEG   Final    Special Requests: 06/19/2017     Final                    Value:NO SPECIAL REQUESTS  Reflexed from [id number]      Franklin Count 06/19/2017     Final                    Value:[id number]  COLONIES/mL      Culture result: 06/19/2017 CITROBACTER KOSERI*   Final       Review of Systems   Constitutional: Negative for malaise/fatigue. HENT: Negative for congestion. Respiratory: Positive for cough. Negative for shortness of breath. Musculoskeletal: Negative for joint pain and myalgias. Neurological: Negative for weakness and headaches. Visit Vitals    /78 (BP 1 Location: Left arm, BP Patient Position: Sitting)    Pulse 81    Temp 98 °F (36.7 °C) (Oral)    Resp 14    Ht 5' 6\" (1.676 m)    Wt 157 lb (71.2 kg)    SpO2 97%    BMI 25.34 kg/m2       Physical Exam   Constitutional: He is oriented to person, place, and time. He appears well-developed and well-nourished. No distress. HENT:   Right Ear: External ear normal.   Left Ear: External ear normal.   Eyes: Conjunctivae and EOM are normal. Right eye exhibits no discharge. Left eye exhibits no discharge. Neck: Normal range of motion. Neck supple.    Cardiovascular: Normal rate and regular rhythm. Pulmonary/Chest: Effort normal and breath sounds normal. He has no wheezes. Abdominal: Soft. Bowel sounds are normal. There is no tenderness. Lymphadenopathy:     He has no cervical adenopathy. Neurological: He is alert and oriented to person, place, and time. Skin: He is not diaphoretic. Psychiatric: He has a normal mood and affect. His behavior is normal.   Nursing note and vitals reviewed. ASSESSMENT and PLAN    ICD-10-CM ICD-9-CM    1. Essential hypertension I10 401.9 lisinopril (PRINIVIL, ZESTRIL) 20 mg tablet sent to pharmacy      hydrALAZINE (APRESOLINE) 50 mg tablet sent to pharmacy      METABOLIC PANEL, COMPREHENSIVE    I refilled his medications and will recheck his creatinine today. I want him to bring his medications when he returns in 3 months. I also want him to check his BP when he meets with his stroke group twice a week. 2. History of CVA (cerebrovascular accident) Z86.73 V12.54 LIPID PANEL    Pt follows with neurology. Will recheck his cholesterol levels today. 3. Postural dizziness R42 780.4 I discussed that this may be a result of dehydration and/or not eating enough. 4. Anxiety disorder, unspecified type F41.9 300.00 escitalopram oxalate (LEXAPRO) 10 mg tablet sent to pharmacy    I refilled his Lexapro. 5. Tobacco abuse Z72.0 305.1 I urged him to quit smoking. This plan was reviewed with the patient and patient agrees. All questions were answered. This scribe documentation was reviewed by me and accurately reflects the examination and decisions made by me. This note will not be viewable in [address]

## 2017-07-03 NOTE — TELEPHONE ENCOUNTER
LVM on [phone] that prior authorization was received from [facility] pharmacy. Advised as per Dr Judy Huertas that patient should take script to [facility] and it should cost $4.  With blink card. Advised if there were any questions to please give us a call.

## 2017-07-03 NOTE — TELEPHONE ENCOUNTER
Checked as requested the [address] [facility] list for medications Clonidine patch is on the $4. List as well as several other options

## 2017-07-03 NOTE — MR AVS SNAPSHOT
Visit Information Date & Time Provider Department Dept. Phone Encounter #  
 7/3/2017 11:30 AM Marquez Robles, [address] [facility],[address] Internal Medicine [phone] [id number] Upcoming Health Maintenance Date Due DTaP/Tdap/Td series (1 - Tdap) 3/17/2004 INFLUENZA AGE 9 TO ADULT 8/1/2017 Allergies as of 7/3/2017  Review Complete On: 7/3/2017 By: Marquez Robles MD  
 No Known Allergies Current Immunizations  Never Reviewed Name Date Influenza Vaccine PF 2/4/2016  9:22 AM  
 Pneumococcal Polysaccharide (PPSV-23) 2/15/2016  1:15 PM  
  
 Not reviewed this visit You Were Diagnosed With   
  
 Codes Comments Essential hypertension    -  Primary ICD-10-CM: I10 
ICD-9-CM: 401.9 History of CVA (cerebrovascular accident)     ICD-10-CM: Z86.73 
ICD-9-CM: V12.54 Postural dizziness     ICD-10-CM: F54 ICD-9-CM: 780.4 Anxiety disorder, unspecified type     ICD-10-CM: F41.9 ICD-9-CM: 300.00 Tobacco abuse     ICD-10-CM: Z72.0 ICD-9-CM: 305.1 Vitals BP Pulse Temp Resp Height(growth percentile) Weight(growth percentile) 122/78 (BP 1 Location: Left arm, BP Patient Position: Sitting) 81 98 °F (36.7 °C) (Oral) 14 5' 6\" (1.676 m) 157 lb (71.2 kg) SpO2 BMI Smoking Status 97% 25.34 kg/m2 Current Every Day Smoker Vitals History BMI and BSA Data Body Mass Index Body Surface Area  
 25.34 kg/m 2 1.82 m 2 Preferred Pharmacy Pharmacy Name Phone [id number] [address] [id number] [phone] Your Updated Medication List  
  
   
This list is accurate as of: 7/3/17 11:50 AM.  Always use your most recent med list.  
  
  
  
  
 ALPRAZolam 0.25 mg tablet Commonly known as:  Davis Castorena Take 1 Tab by mouth nightly as needed for Anxiety for up to 30 doses. Max Daily Amount: 0.25 mg.  
  
 ergocalciferol 50,000 unit capsule Commonly known as:  ERGOCALCIFEROL Take 1 Cap by mouth every seven (7) days. escitalopram oxalate 10 mg tablet Commonly known as:  Sepideh Gill Take 1 Tab by mouth daily for 90 days. hydrALAZINE 50 mg tablet Commonly known as:  APRESOLINE Take 1 Tab by mouth four (4) times daily for 30 days. lisinopril 20 mg tablet Commonly known as:  Velton Centeno Take 1 Tab by mouth daily for 90 days. metoprolol succinate 100 mg tablet Commonly known as:  TOPROL-XL Take 1 Tab by mouth daily. Prescriptions Sent to Pharmacy Refills  
 lisinopril (PRINIVIL, ZESTRIL) 20 mg tablet 2 Sig: Take 1 Tab by mouth daily for 90 days. Class: Normal  
 Pharmacy: [facility]/pharmacy #[id number] Jr Robledo, [address] Ph #: [phone] Route: Oral  
 hydrALAZINE (APRESOLINE) 50 mg tablet 1 Sig: Take 1 Tab by mouth four (4) times daily for 30 days. Class: Normal  
 Pharmacy: [facility]/pharmacy #[id number] Jr Robledo, [address] Ph #: [phone] Route: Oral  
 escitalopram oxalate (LEXAPRO) 10 mg tablet 0 Sig: Take 1 Tab by mouth daily for 90 days. Class: Normal  
 Pharmacy: [facility]/pharmacy #[id number] Jr Robledo, [address] Ph #: [phone] Route: Oral  
  
We Performed the Following LIPID PANEL [86666 CPT(R)] METABOLIC PANEL, COMPREHENSIVE [41514 CPT(R)] Introducing [facility] & HEALTH SERVICES! Abril Sosa introduces [employer] patient portal. Now you can access parts of your medical record, email your doctor's office, and request medication refills online. 1. In your internet browser, go to [email or web address] 2. Click on the First Time User? Click Here link in the Sign In box. You will see the New Member Sign Up page. 3. Enter your [employer] Access Code exactly as it appears below. You will not need to use this code after youve completed the sign-up process.  If you do not sign up before the expiration date, you must request a new code. · Press About Us Access Code: [email or web address] Expires: 9/30/2017  7:57 PM 
 
4. Enter the last four digits of your Social Security Number (xxxx) and Date of Birth (mm/dd/yyyy) as indicated and click Submit. You will be taken to the next sign-up page. 5. Create a Press About Us ID. This will be your [employer] login ID and cannot be changed, so think of one that is secure and easy to remember. 6. Create a [employer] About Us password. You can change your password at any time. 7. Enter your Password Reset Question and Answer. This can be used at a later time if you forget your password. 8. Enter your e-mail address. You will receive e-mail notification when new information is available in [address] 9. Click Sign Up. You can now view and download portions of your medical record. 10. Click the Download Summary menu link to download a portable copy of your medical information. If you have questions, please visit the Frequently Asked Questions section of the [employer] website. Remember, [employer] is NOT to be used for urgent needs. For medical emergencies, dial 911. Now available from your iPhone and Android! Please provide this summary of care documentation to your next provider. Your primary care clinician is listed as Kaylee Clement. If you have any questions after today's visit, please call [phone].

## 2017-07-03 NOTE — PROGRESS NOTES
1. Have you been to the ER, urgent care clinic since your last visit? Hospitalized since your last visit? Yes, [address] [facility] for Dizziness 17    2. Have you seen or consulted any other health care providers outside of the [address] [place] [address] since your last visit? Include any pap smears or colon screening. No    Chief Complaint   Patient presents with    Hypertension     3 month f/u    Anxiety     3 month f/u    Medication Refill     Needs new Rx for Clonidine, will try [facility]. Taking all medications in chart but have \"\". Fasting. Unsure of status of TDaP. Clonidine Rx- states Kade Khan called him this AM stating Rx was \"suspended\".

## 2017-07-04 LAB
ALBUMIN SERPL-MCNC: 4.2 G/DL (ref 3.5–5.5)
ALBUMIN/GLOB SERPL: 1.4 {RATIO} (ref 1.2–2.2)
ALP SERPL-CCNC: 81 IU/L (ref 39–117)
ALT SERPL-CCNC: 10 IU/L (ref 0–44)
AST SERPL-CCNC: 13 IU/L (ref 0–40)
BILIRUB SERPL-MCNC: 0.8 MG/DL (ref 0–1.2)
BUN SERPL-MCNC: 16 MG/DL (ref 6–20)
BUN/CREAT SERPL: 13 (ref 9–20)
CALCIUM SERPL-MCNC: 9.8 MG/DL (ref 8.7–10.2)
CHLORIDE SERPL-SCNC: 103 MMOL/L (ref 96–106)
CHOLEST SERPL-MCNC: 179 MG/DL (ref 100–199)
CO2 SERPL-SCNC: 19 MMOL/L (ref 18–29)
CREAT SERPL-MCNC: 1.2 MG/DL (ref 0.76–1.27)
GLOBULIN SER CALC-MCNC: 3 G/DL (ref 1.5–4.5)
GLUCOSE SERPL-MCNC: 99 MG/DL (ref 65–99)
HDLC SERPL-MCNC: 50 MG/DL
INTERPRETATION, 910389: NORMAL
LDLC SERPL CALC-MCNC: 112 MG/DL (ref 0–99)
POTASSIUM SERPL-SCNC: 4 MMOL/L (ref 3.5–5.2)
PROT SERPL-MCNC: 7.2 G/DL (ref 6–8.5)
SODIUM SERPL-SCNC: 142 MMOL/L (ref 134–144)
TRIGL SERPL-MCNC: 83 MG/DL (ref 0–149)
VLDLC SERPL CALC-MCNC: 17 MG/DL (ref 5–40)

## 2017-07-04 NOTE — PROGRESS NOTES
His creatinine has improved. Ask him if he is taking Lipitor daily.  If it is too expensive, we can give him Pravachol or Lovastatin which ever is on [facility] $4 list.

## 2017-07-05 DIAGNOSIS — E78.2 MIXED HYPERLIPIDEMIA: Primary | ICD-10-CM

## 2017-07-05 RX ORDER — PRAVASTATIN SODIUM 10 MG/1
10 TABLET ORAL
Qty: 30 TAB | Refills: 2 | Status: SHIPPED | OUTPATIENT
Start: 2017-07-05 | End: 2017-07-11 | Stop reason: SDUPTHER

## 2017-07-05 NOTE — PROGRESS NOTES
Pt stated was never on anything for his cholesterol. He is open to trying something. Can send to pharmacy on file.

## 2017-07-08 DIAGNOSIS — I10 ESSENTIAL HYPERTENSION: ICD-10-CM

## 2017-07-09 RX ORDER — LISINOPRIL 20 MG/1
TABLET ORAL
Qty: 90 TAB | Refills: 0 | Status: SHIPPED | OUTPATIENT
Start: 2017-07-09 | End: 2017-12-04 | Stop reason: SDUPTHER

## 2017-07-09 RX ORDER — AMLODIPINE BESYLATE 5 MG/1
TABLET ORAL
Qty: 90 TAB | Refills: 0 | Status: SHIPPED | OUTPATIENT
Start: 2017-07-09 | End: 2017-07-12 | Stop reason: SDUPTHER

## 2017-07-10 ENCOUNTER — TELEPHONE (OUTPATIENT)
Dept: INTERNAL MEDICINE CLINIC | Age: 32
End: 2017-07-10

## 2017-07-10 NOTE — TELEPHONE ENCOUNTER
Patient medication for amlodopine and lisinopril has been denied at [facility] and states it requires a prior auth, however patient is on Snappli amlodopine is not on the [facility] $4 list but lisinopril is    Would you like for me to advise patient to get Lisinopril at [address] [place] [address],  I have the list printed out what is accepted at the [address] [place] [address] if you would also like to use an alternative for the amlodopine I have the list printed?

## 2017-07-11 DIAGNOSIS — E78.2 MIXED HYPERLIPIDEMIA: ICD-10-CM

## 2017-07-11 RX ORDER — PRAVASTATIN SODIUM 10 MG/1
10 TABLET ORAL
Qty: 30 TAB | Refills: 2 | Status: SHIPPED | OUTPATIENT
Start: 2017-07-11 | End: 2017-10-03 | Stop reason: ALTCHOICE

## 2017-07-12 ENCOUNTER — TELEPHONE (OUTPATIENT)
Dept: INTERNAL MEDICINE CLINIC | Age: 32
End: 2017-07-12

## 2017-07-12 DIAGNOSIS — I10 ESSENTIAL HYPERTENSION: ICD-10-CM

## 2017-07-12 RX ORDER — AMLODIPINE BESYLATE 5 MG/1
TABLET ORAL
Qty: 90 TAB | Refills: 0 | Status: SHIPPED | OUTPATIENT
Start: 2017-07-12

## 2017-07-12 NOTE — TELEPHONE ENCOUNTER
Received prior auth for pravastatin  Patient has [facility] this medication is not on [employer] $4.00 list.  Do you want to order an alternative?

## 2017-07-13 ENCOUNTER — DOCUMENTATION ONLY (OUTPATIENT)
Dept: INTERNAL MEDICINE CLINIC | Age: 32
End: 2017-07-13

## 2017-07-13 NOTE — PROGRESS NOTES
Medication for amlodipine has been sent to the pharmacy requested by Sheri De Jesus as per notes in telephone encounter from 7/10/2017

## 2017-07-19 ENCOUNTER — TELEPHONE (OUTPATIENT)
Dept: INTERNAL MEDICINE CLINIC | Age: 32
End: 2017-07-19

## 2017-07-19 NOTE — TELEPHONE ENCOUNTER
Patient is on Luxanova  Metoprolol and lisinopril are on the $4. List for [facility]  [facility] for patient to return call. Need to know which pharmacy [facility] patient would like to have these sent to so that he can get his medication denied at [facility] which is in the pharmacy on file.

## 2017-07-24 DIAGNOSIS — E78.2 MIXED HYPERLIPIDEMIA: Primary | ICD-10-CM

## 2017-07-24 RX ORDER — LOVASTATIN 10 MG/1
10 TABLET ORAL
Qty: 30 TAB | Refills: 2 | Status: SHIPPED | OUTPATIENT
Start: 2017-07-24 | End: 2017-10-22

## 2017-07-24 NOTE — TELEPHONE ENCOUNTER
Had put in for Prior auth which was denied for the pravastatin, also rechecked the $4 list for [facility] and pravochol and pravastatin are no longer on that list.

## 2017-09-27 DIAGNOSIS — I10 ESSENTIAL HYPERTENSION: ICD-10-CM

## 2017-09-27 RX ORDER — HYDRALAZINE HYDROCHLORIDE 50 MG/1
TABLET, FILM COATED ORAL
Qty: 120 TAB | Refills: 1 | Status: SHIPPED | OUTPATIENT
Start: 2017-09-27 | End: 2020-03-06 | Stop reason: ALTCHOICE

## 2017-10-03 ENCOUNTER — OFFICE VISIT (OUTPATIENT)
Dept: INTERNAL MEDICINE CLINIC | Age: 32
End: 2017-10-03

## 2017-10-03 VITALS
BODY MASS INDEX: 24.4 KG/M2 | RESPIRATION RATE: 18 BRPM | HEART RATE: 77 BPM | OXYGEN SATURATION: 98 % | SYSTOLIC BLOOD PRESSURE: 126 MMHG | HEIGHT: 66 IN | DIASTOLIC BLOOD PRESSURE: 88 MMHG | TEMPERATURE: 97.5 F | WEIGHT: 151.8 LBS

## 2017-10-03 DIAGNOSIS — Z86.73 HISTORY OF STROKE: ICD-10-CM

## 2017-10-03 DIAGNOSIS — Z72.0 TOBACCO ABUSE: ICD-10-CM

## 2017-10-03 DIAGNOSIS — F41.8 DEPRESSION WITH ANXIETY: ICD-10-CM

## 2017-10-03 DIAGNOSIS — I10 ESSENTIAL HYPERTENSION: Primary | ICD-10-CM

## 2017-10-03 RX ORDER — ESCITALOPRAM OXALATE 10 MG/1
10 TABLET ORAL DAILY
Qty: 90 TAB | Refills: 0 | Status: SHIPPED | OUTPATIENT
Start: 2017-10-03 | End: 2018-01-01

## 2017-10-03 NOTE — MR AVS SNAPSHOT
Visit Information Date & Time Provider Department Dept. Phone Encounter #  
 10/3/2017 11:30 AM Kitty Kruse, [address] [facility],[address] Internal Medicine [phone] [id number] Upcoming Health Maintenance Date Due DTaP/Tdap/Td series (1 - Tdap) 3/17/2004 Allergies as of 10/3/2017  Review Complete On: 10/3/2017 By: Kitty Kruse MD  
 No Known Allergies Current Immunizations  Never Reviewed Name Date Influenza Vaccine PF 2/4/2016  9:22 AM  
 Pneumococcal Polysaccharide (PPSV-23) 2/15/2016  1:15 PM  
  
 Not reviewed this visit You Were Diagnosed With   
  
 Codes Comments Essential hypertension    -  Primary ICD-10-CM: I10 
ICD-9-CM: 401.9 History of stroke     ICD-10-CM: Z86.73 
ICD-9-CM: V12.54 Depression with anxiety     ICD-10-CM: F41.8 ICD-9-CM: 300.4 Tobacco abuse     ICD-10-CM: Z72.0 ICD-9-CM: 305.1 Vitals BP Pulse Temp Resp Height(growth percentile) Weight(growth percentile) 126/88 (BP 1 Location: Left arm, BP Patient Position: Sitting) 77 97.5 °F (36.4 °C) (Oral) 18 5' 6\" (1.676 m) 151 lb 12.8 oz (68.9 kg) SpO2 BMI Smoking Status 98% 24.5 kg/m2 Current Every Day Smoker Vitals History BMI and BSA Data Body Mass Index Body Surface Area 24.5 kg/m 2 1.79 m 2 Preferred Pharmacy Pharmacy Name Phone [id number] [address] [id number] [phone] Your Updated Medication List  
  
   
This list is accurate as of: 10/3/17 12:34 PM.  Always use your most recent med list. amLODIPine 5 mg tablet Commonly known as:  Tad Sylvie TAKE 1 TABLET EVERY DAY  
  
 ergocalciferol 50,000 unit capsule Commonly known as:  ERGOCALCIFEROL Take 1 Cap by mouth every seven (7) days. escitalopram oxalate 10 mg tablet Commonly known as:  [place] Denier Take 1 Tab by mouth daily for 90 days. hydrALAZINE 50 mg tablet Commonly known as:  APRESOLINE  
 TAKE 1 TAB BY MOUTH FOUR (4) TIMES DAILY FOR 30 DAYS. lisinopril 20 mg tablet Commonly known as:  PRINIVIL, ZESTRIL  
TAKE 1 TABLET EVERY DAY  
  
 lovastatin 10 mg tablet Commonly known as:  MEVACOR Take 1 Tab by mouth nightly for 90 days. metoprolol succinate 100 mg tablet Commonly known as:  TOPROL-XL Take 1 Tab by mouth daily. Prescriptions Sent to Pharmacy Refills  
 escitalopram oxalate (LEXAPRO) 10 mg tablet 0 Sig: Take 1 Tab by mouth daily for 90 days. Class: Normal  
 Pharmacy: [facility]/pharmacy #[id number] Gemma Meléndez, [address] Ph #: [phone] Route: Oral  
  
We Performed the Following LIPID PANEL [36696 CPT(R)] METABOLIC PANEL, COMPREHENSIVE [51759 CPT(R)] Introducing [facility] SERVICES! Lele Harrington introduces [employer] patient portal. Now you can access parts of your medical record, email your doctor's office, and request medication refills online. 1. In your internet browser, go to [email or web address] 2. Click on the First Time User? Click Here link in the Sign In box. You will see the New Member Sign Up page. 3. Enter your [employer] Access Code exactly as it appears below. You will not need to use this code after youve completed the sign-up process. If you do not sign up before the expiration date, you must request a new code. · [employer] Access Code: [email or web address] Expires: 1/1/2018 11:18 AM 
 
4. Enter the last four digits of your Social Security Number (xxxx) and Date of Birth (mm/dd/yyyy) as indicated and click Submit. You will be taken to the next sign-up page. 5. Create a [employer] ID. This will be your [employer] login ID and cannot be changed, so think of one that is secure and easy to remember. 6. Create a [employer] password. You can change your password at any time. 7. Enter your Password Reset Question and Answer. This can be used at a later time if you forget your password. 8. Enter your e-mail address. You will receive e-mail notification when new information is available in [address] 9. Click Sign Up. You can now view and download portions of your medical record. 10. Click the Download Summary menu link to download a portable copy of your medical information. If you have questions, please visit the Frequently Asked Questions section of the [facility] website. Remember, [facility] is NOT to be used for urgent needs. For medical emergencies, dial 911. Now available from your iPhone and Android! Please provide this summary of care documentation to your next provider. Your primary care clinician is listed as Linnea Sullivan. If you have any questions after today's visit, please call [phone].

## 2017-10-03 NOTE — PROGRESS NOTES
Pt here for 3 month follow up visit. States that he has no concerns at this time. Chief Complaint   Patient presents with    Follow-up     Visit Vitals    BP (!) 126/94 (BP 1 Location: Left arm, BP Patient Position: Sitting)    Pulse 77    Temp 97.5 °F (36.4 °C) (Oral)    Resp 18    Ht 5' 6\" (1.676 m)    Wt 151 lb 12.8 oz (68.9 kg)    SpO2 98%    BMI 24.5 kg/m2      1. Have you been to the ER, urgent care clinic since your last visit? Hospitalized since your last visit? No    2. Have you seen or consulted any other health care providers outside of the [address] [place] [address] since your last visit? Include any pap smears or colon screening.  No

## 2017-10-03 NOTE — LETTER
10/6/2017 10:01 AM 
 
. [address] [facility] Chidi Porter [address] [address] Dear [address] [place] [address]: 
 
Please find your most recent results below. Resulted Orders LIPID PANEL Result Value Ref Range Cholesterol, total 179 100 - 199 mg/dL Triglyceride 112 0 - 149 mg/dL HDL Cholesterol 50 >39 mg/dL VLDL, calculated 22 5 - 40 mg/dL LDL, calculated 107 (H) 0 - 99 mg/dL Narrative Performed at:  [address]  [id number] : Yamini Lomeli MD, Phone:  [phone] METABOLIC PANEL, COMPREHENSIVE Result Value Ref Range Glucose 94 65 - 99 mg/dL BUN 13 6 - 20 mg/dL Creatinine 1.03 0.76 - 1.27 mg/dL GFR est non-AA 94 >59 mL/min/1.73 GFR est  >59 mL/min/1.73  
 BUN/Creatinine ratio 13 9 - 20 Sodium 140 134 - 144 mmol/L Potassium 4.1 3.5 - 5.2 mmol/L Chloride 101 96 - 106 mmol/L  
 CO2 24 18 - 29 mmol/L Calcium 9.9 8.7 - 10.2 mg/dL Protein, total 7.0 6.0 - 8.5 g/dL Albumin 4.2 3.5 - 5.5 g/dL GLOBULIN, TOTAL 2.8 1.5 - 4.5 g/dL A-G Ratio 1.5 1.2 - 2.2 Bilirubin, total 0.6 0.0 - 1.2 mg/dL Alk. phosphatase 75 39 - 117 IU/L  
 AST (SGOT) 13 0 - 40 IU/L  
 ALT (SGPT) 11 0 - 44 IU/L Narrative Performed at:  [address]  [id number] : Yamini Lomeli MD, Phone:  [phone] CVD REPORT Result Value Ref Range INTERPRETATION Note Comment:  
   Supplement report is available. Narrative Performed at:  [address] [place] [address]  [id number] : Lulu Darnell PhD, Phone:  [phone] RECOMMENDATIONS: 
None. Keep up the good work! Please call me if you have any questions: [phone] Sincerely, Pool Samaniego MD

## 2017-10-03 NOTE — PROGRESS NOTES
Written by Paris Holt, as dictated by Dr. Jess Gama MD.    Aleta Ackerman is a 27 y.o. male. HPI  The patient comes in today for a follow-up. He is accompanied by his power of  (cousin) and still lives with his father. He went to  for a month and wants to do further skills training so he can try to find work. His POA says he has still been feeling depressed, but he has not been taking Lexapro because he thought he has to take prn. He says his appetite is normal, but his POA says he only eats one meal per day. His BP is elevated in the office today at 126/94, rechecked at 126/88. He is compliant on all his BP medications (metoprolol, lisinopril, hydralazine, amlodipine). He has not been using a clonidine patch. He has not been feeling dizzy since he last went to the hospital in 06/2017. He has not been checking his BP often, though he does take it when he meets with his stroke club, and it is usually around 140/90. He has not been taking Pravachol. He denies headaches, body aches, constipation. He still smokes cigarettes. Patient Active Problem List   Diagnosis Code    ICH (intracerebral hemorrhage) ([facility] I61.9    CVA (cerebrovascular accident due to intracerebral hemorrhage) [facility] I61.9    Counseling regarding advanced directives and goals of care Z71.89    Physical debility R53.81    Anxiety F41.9    Bipolar 1 disorder ([facility] F31.9        Current Outpatient Prescriptions on File Prior to Visit   Medication Sig Dispense Refill    hydrALAZINE (APRESOLINE) 50 mg tablet TAKE 1 TAB BY MOUTH FOUR (4) TIMES DAILY FOR 30 DAYS. 120 Tab 1    amLODIPine (NORVASC) 5 mg tablet TAKE 1 TABLET EVERY DAY 90 Tab 0    lisinopril (PRINIVIL, ZESTRIL) 20 mg tablet TAKE 1 TABLET EVERY DAY 90 Tab 0    ergocalciferol (ERGOCALCIFEROL) 50,000 unit capsule Take 1 Cap by mouth every seven (7) days.  12 Cap 1    metoprolol succinate (TOPROL-XL) 100 mg tablet Take 1 Tab by mouth daily. 90 Tab 3    lovastatin (MEVACOR) 10 mg tablet Take 1 Tab by mouth nightly for 90 days. 30 Tab 2     No current facility-administered medications on file prior to visit. Past Medical History:   Diagnosis Date    Anxiety     Chronic kidney disease     patient unsure    Hypertension     Stroke [facility])        Past Surgical History:   Procedure Laterality Date    HX ORTHOPAEDIC      rupture tendon    PLACE PERCUT GASTROSTOMY TUBE  1/27/2016            Family History   Problem Relation Age of Onset    Lupus Mother     Cancer Paternal Uncle     Stroke Paternal Grandmother        Social History     Social History    Marital status: SINGLE     Spouse name: N/A    Number of children: N/A    Years of education: N/A     Occupational History    Not on file. Social History Main Topics    Smoking status: Current Every Day Smoker     Packs/day: 0.50     Years: 10.00    Smokeless tobacco: Never Used    Alcohol use 1.5 oz/week     3 Standard drinks or equivalent per week      Comment: occasional    Drug use: No    Sexual activity: Yes     Other Topics Concern    Not on file     Social History Narrative           Review of Systems   Constitutional: Negative for malaise/fatigue. HENT: Negative for congestion. Respiratory: Negative for cough and shortness of breath. Gastrointestinal: Negative for constipation. Musculoskeletal: Negative for joint pain and myalgias. Neurological: Negative for sensory change, weakness and headaches. Psychiatric/Behavioral: Positive for depression. Negative for memory loss and substance abuse. The patient is nervous/anxious. Visit Vitals    /88 (BP 1 Location: Left arm, BP Patient Position: Sitting)    Pulse 77    Temp 97.5 °F (36.4 °C) (Oral)    Resp 18    Ht 5' 6\" (1.676 m)    Wt 151 lb 12.8 oz (68.9 kg)    SpO2 98%    BMI 24.5 kg/m2       Physical Exam   Constitutional: He is oriented to person, place, and time.  He appears well-developed and well-nourished. No distress. HENT:   Right Ear: External ear normal.   Left Ear: External ear normal.   Eyes: Conjunctivae and EOM are normal. Right eye exhibits no discharge. Left eye exhibits no discharge. Neck: Normal range of motion. Neck supple. Cardiovascular: Normal rate and regular rhythm. Pulmonary/Chest: Effort normal and breath sounds normal. He has no wheezes. Abdominal: Soft. Bowel sounds are normal. There is no tenderness. Lymphadenopathy:     He has no cervical adenopathy. Neurological: He is alert and oriented to person, place, and time. R-sided weakness   Skin: He is not diaphoretic. Psychiatric: He has a normal mood and affect. His behavior is normal.   Nursing note and vitals reviewed. ASSESSMENT and PLAN    ICD-10-CM ICD-9-CM    1. Essential hypertension I10 401.9 LIPID PANEL      METABOLIC PANEL, COMPREHENSIVE    BP is well-controlled on current meds. No change to dosage. I told him he does not need to use the clonidine patch. Will recheck cholesterol today, but he has not been taking Pravachol. 2. History of stroke Z86.73 V12.54 He follows with neurology and meets with a stroke group at [address] [place] [address]. 3. Depression with anxiety F41.8 300.4 escitalopram oxalate (LEXAPRO) 10 mg tablet sent to pharmacy    I explained that Xanax is to be taken prn and Lexapro is supposed to be taken every day. I would like him to start on Lexapro. 4. Tobacco abuse Z72.0 305.1 I urged him to quit, and to start doing activities outside the house as he states he smokes because he is at home. This plan was reviewed with the patient and patient agrees. All questions were answered. This scribe documentation was reviewed by me and accurately reflects the examination and decisions made by me. This note will not be viewable in [address]

## 2017-10-04 LAB
ALBUMIN SERPL-MCNC: 4.2 G/DL (ref 3.5–5.5)
ALBUMIN/GLOB SERPL: 1.5 {RATIO} (ref 1.2–2.2)
ALP SERPL-CCNC: 75 IU/L (ref 39–117)
ALT SERPL-CCNC: 11 IU/L (ref 0–44)
AST SERPL-CCNC: 13 IU/L (ref 0–40)
BILIRUB SERPL-MCNC: 0.6 MG/DL (ref 0–1.2)
BUN SERPL-MCNC: 13 MG/DL (ref 6–20)
BUN/CREAT SERPL: 13 (ref 9–20)
CALCIUM SERPL-MCNC: 9.9 MG/DL (ref 8.7–10.2)
CHLORIDE SERPL-SCNC: 101 MMOL/L (ref 96–106)
CHOLEST SERPL-MCNC: 179 MG/DL (ref 100–199)
CO2 SERPL-SCNC: 24 MMOL/L (ref 18–29)
CREAT SERPL-MCNC: 1.03 MG/DL (ref 0.76–1.27)
GLOBULIN SER CALC-MCNC: 2.8 G/DL (ref 1.5–4.5)
GLUCOSE SERPL-MCNC: 94 MG/DL (ref 65–99)
HDLC SERPL-MCNC: 50 MG/DL
INTERPRETATION, 910389: NORMAL
LDLC SERPL CALC-MCNC: 107 MG/DL (ref 0–99)
POTASSIUM SERPL-SCNC: 4.1 MMOL/L (ref 3.5–5.2)
PROT SERPL-MCNC: 7 G/DL (ref 6–8.5)
SODIUM SERPL-SCNC: 140 MMOL/L (ref 134–144)
TRIGL SERPL-MCNC: 112 MG/DL (ref 0–149)
VLDLC SERPL CALC-MCNC: 22 MG/DL (ref 5–40)

## 2017-12-04 DIAGNOSIS — I10 ESSENTIAL HYPERTENSION: ICD-10-CM

## 2017-12-04 RX ORDER — LISINOPRIL 20 MG/1
TABLET ORAL
Qty: 90 TAB | Refills: 0 | Status: SHIPPED | OUTPATIENT
Start: 2017-12-04 | End: 2020-03-06 | Stop reason: ALTCHOICE

## 2018-11-28 DIAGNOSIS — I10 ESSENTIAL HYPERTENSION: ICD-10-CM

## 2018-11-28 RX ORDER — LISINOPRIL 20 MG/1
20 TABLET ORAL DAILY
Qty: 30 TAB | Refills: 2 | Status: SHIPPED | OUTPATIENT
Start: 2018-11-28 | End: 2019-02-26

## 2020-03-06 ENCOUNTER — OFFICE VISIT (OUTPATIENT)
Dept: NEUROLOGY | Age: 35
End: 2020-03-06

## 2020-03-06 VITALS
SYSTOLIC BLOOD PRESSURE: 130 MMHG | OXYGEN SATURATION: 98 % | HEIGHT: 66 IN | WEIGHT: 166 LBS | RESPIRATION RATE: 12 BRPM | HEART RATE: 71 BPM | DIASTOLIC BLOOD PRESSURE: 90 MMHG | BODY MASS INDEX: 26.68 KG/M2

## 2020-03-06 DIAGNOSIS — R47.01 APHASIA: ICD-10-CM

## 2020-03-06 DIAGNOSIS — I61.0 NONTRAUMATIC SUBCORTICAL HEMORRHAGE OF RIGHT CEREBRAL HEMISPHERE (HCC): ICD-10-CM

## 2020-03-06 DIAGNOSIS — Z86.73 HISTORY OF STROKE: ICD-10-CM

## 2020-03-06 DIAGNOSIS — I61.8 OTHER LEFT-SIDED NONTRAUMATIC INTRACEREBRAL HEMORRHAGE (HCC): Primary | ICD-10-CM

## 2020-03-06 DIAGNOSIS — I65.23 BILATERAL CAROTID ARTERY STENOSIS: ICD-10-CM

## 2020-03-06 DIAGNOSIS — I61.0 NONTRAUMATIC SUBCORTICAL HEMORRHAGE OF LEFT CEREBRAL HEMISPHERE (HCC): ICD-10-CM

## 2020-03-06 RX ORDER — ESCITALOPRAM OXALATE 20 MG/1
20 TABLET ORAL DAILY
COMMUNITY
Start: 2018-12-05

## 2020-03-06 RX ORDER — BACLOFEN 5 MG/1
5 TABLET ORAL 3 TIMES DAILY
COMMUNITY

## 2020-03-06 RX ORDER — SIMVASTATIN 10 MG/1
10 TABLET, FILM COATED ORAL
COMMUNITY
Start: 2020-02-29

## 2020-03-06 RX ORDER — GABAPENTIN 300 MG/1
CAPSULE ORAL
COMMUNITY
Start: 2020-02-01

## 2020-03-06 NOTE — PROGRESS NOTES
Consult  REFERRED BY:  Judson Sepulveda MD    CHIEF COMPLAINT: Difficulty with speech and possible recurrent stroke      Subjective:     Satish Stewart is a 37 y.o. right-handed -American male we are seeing as a new patient to us, for evaluation of new problem of more difficulty with his speech, and referred by the speech therapist because of increasing speech difficulty thought to be perhaps related to a recurrent stroke in a patient who has had 2 intracranial hemorrhages from accelerated hypertension in the past.  The patient had his first hemorrhage involving the right basal ganglion area secondary to uncontrolled hypertension in 2014, and had a negative CTA and recovered almost completely from that illness. Then in 2016 he again got readmitted with uncontrolled hypertension and a large basal ganglion intracranial hemorrhage on the left, for which he has suffered with some mild aphasia and a fairly prominent right spastic hemiparesis but able to walk with a cane. Patient speech started to get worse about a month ago, for unclear reason. He denied any headache, or any other new focal weakness or sensory loss. His blood pressure has remained fairly well controlled. He had a repeat CTA done in 2016 also that showed no significant vascular anomaly. Patient denies any recent head trauma, fever, meningismus, new medication, any unusual stress or tension or any other cause of his speech problems.     Past Medical History:   Diagnosis Date    Anxiety     Chronic kidney disease     patient unsure    Hypertension     Stroke [facility]) 12/2014 & 01/2016      Past Surgical History:   Procedure Laterality Date    HX ORTHOPAEDIC      rupture tendon    PLACE PERCUT GASTROSTOMY TUBE  1/27/2016          Family History   Problem Relation Age of Onset    Lupus Mother     Cancer Paternal Uncle     Stroke Paternal Grandmother     Heart Disease Father     Drug Abuse Father     Liver Disease Father         hepatitis    Other Sister         short leg    Hypertension Sister       Social History     Tobacco Use    Smoking status: Former Smoker     Packs/day: 0.00     Years: 10.00     Pack years: 0.00     Last attempt to quit: 10/2019     Years since quittin.4    Smokeless tobacco: Never Used   Substance Use Topics    Alcohol use: Not Currently     Alcohol/week: 2.5 standard drinks     Types: 3 Standard drinks or equivalent per week     Comment: occasional         Current Outpatient Medications:     simvastatin (ZOCOR) 10 mg tablet, Take 10 mg by mouth nightly., Disp: , Rfl:     gabapentin (NEURONTIN) 300 mg capsule, TAKE 1 CAPSULE BY MOUTH TWICE DAILY, Disp: , Rfl:     baclofen 5 mg tab, 5 mg three (3) times daily. , Disp: , Rfl:     escitalopram oxalate (LEXAPRO) 20 mg tablet, Take 20 mg by mouth daily. , Disp: , Rfl:     amLODIPine (NORVASC) 5 mg tablet, TAKE 1 TABLET EVERY DAY, Disp: 90 Tab, Rfl: 0    metoprolol succinate (TOPROL-XL) 100 mg tablet, Take 1 Tab by mouth daily. , Disp: 90 Tab, Rfl: 3        No Known Allergies   MRI Results (most recent):  Results from Hospital Encounter encounter on 14   MRI BRAIN W WO CONT    Narrative **Final Report**       ICD Codes / Adm. Diagnosis:    / head bleed  Bleeding in head following inj  Examination:  MR BRAIN W AND WO CON  - [id number] - May  7 2014  6:13PM  Accession No:  [id number]  Reason:  r/o small cavernoma or AVM as cause of bleed. REPORT:  EXAM: MRI BRAIN    INDICATION:   head bleed Bleeding in head following inj   r/o small   cavernoma or AVM as cause of bleed. COMPARISON: Head CT today    SEQUENCES:   Sagittal T1, axial T1, T2, GRE and FLAIR; axial and coronal T1   post enhancement; and diffusion imaging of the brain. 8 mL of Gadavist.    FINDINGS:  Right basal ganglia acute hemorrhage is unchanged since earlier head CT. There are 3 punctate foci of hemosiderin staining in the left basal ganglia,   not apparent on CT.     The brain parenchyma otherwise demonstrates normal morphology and signal   intensity. The enhancement pattern is normal.    There is no evidence for mass, shift, or hydrocephalus. There is no   extra-axial fluid collection. Diffusion imaging shows no diffusion   restriction to indicate acute infarct/ischemia or other process. IMPRESSION: Right basal ganglia hemorrhage without significant change since   earlier CT. 3 punctate foci of left basal ganglia hemosiderin. No new   findings. Signing/Reading Doctor: Savanah La ([id number])    Approved: Savanah La ([id number])  May  7 2014  6:34PM                                   Results from Hospital Encounter encounter on 05/07/14   MRI BRAIN W WO CONT    Narrative **Final Report**       ICD Codes / Adm. Diagnosis:    / head bleed  Bleeding in head following inj  Examination:  MR BRAIN W AND WO CON  - [id number] - May  7 2014  6:13PM  Accession No:  [id number]  Reason:  r/o small cavernoma or AVM as cause of bleed. REPORT:  EXAM: MRI BRAIN    INDICATION:   head bleed Bleeding in head following inj   r/o small   cavernoma or AVM as cause of bleed. COMPARISON: Head CT today    SEQUENCES:   Sagittal T1, axial T1, T2, GRE and FLAIR; axial and coronal T1   post enhancement; and diffusion imaging of the brain. 8 mL of Gadavist.    FINDINGS:  Right basal ganglia acute hemorrhage is unchanged since earlier head CT. There are 3 punctate foci of hemosiderin staining in the left basal ganglia,   not apparent on CT. The brain parenchyma otherwise demonstrates normal morphology and signal   intensity. The enhancement pattern is normal.    There is no evidence for mass, shift, or hydrocephalus. There is no   extra-axial fluid collection. Diffusion imaging shows no diffusion   restriction to indicate acute infarct/ischemia or other process.         IMPRESSION: Right basal ganglia hemorrhage without significant change since   earlier CT. 3 punctate foci of left basal ganglia hemosiderin. No new   findings. Signing/Reading Doctor: Clayton Koehler ([id number])    Approved: Clayton Koehler ([id number])  May  7 2014  6:34PM                                 Review of Systems:  A comprehensive review of systems was negative except for: Musculoskeletal: positive for muscle weakness  Neurological: positive for speech problems, coordination problems, gait problems and weakness  Behvioral/Psych: positive for anxiety and depression   Vitals:    03/06/20 1327   BP: 130/90   Pulse: 71   Resp: 12   SpO2: 98%   Weight: 166 lb (75.3 kg)   Height: 5' 6\" (1.676 m)     Objective:     I      NEUROLOGICAL EXAM:    Appearance: The patient is well developed, well nourished, provides a coherent history and is in no acute distress. Mental Status: Oriented to time, place and person, and the president, cognitive function is normal and speech is slightly aphasic and mildly dysarthric. Mood and affect appropriate, but probably a little depressed. Cranial Nerves:   Intact visual fields. Fundi are benign, disc are flat, no lesions seen on funduscopy. CINDY, EOM's full, no nystagmus, no ptosis. Facial sensation is normal. Corneal reflexes are not tested. Facial movement is mildly weak on the right. Hearing is normal bilaterally. Palate is midline with normal sternocleidomastoid and trapezius muscles are normal. Tongue is midline. Neck without meningismus or bruits  Temporal arteries are not tender or enlarged  TMJ areas are not tender on palpation   Motor:  5/5 strength in upper and lower proximal and distal muscles on the left, and patient has a moderate severe spastic right hemiplegia. Normal bulk and tone on the left, but very spastic and hypertonic on the right. No fasciculations. Rapid alternating movement is normal on the left, and markedly reduced on the right   Reflexes:   Deep tendon reflexes 2+/4 on the left, and 3+/4 on the right.   No babinski or clonus present   Sensory:   Normal to touch, pinprick and vibration and temperature on the left but decreased on the right. DSS is decreased on the right   Gait:  Abnormal gait with dense spastic right hemiplegia, and patient needs a cane for ambulation   Tremor:   No tremor noted. Cerebellar: Moderately abnormal cerebellar signs present on Romberg and tandem testing and finger-nose-finger exam because of spastic right hemiplegia. Neurovascular:  Normal heart sounds and regular rhythm, peripheral pulses intact, and no carotid bruits. Assessment:       ICD-10-CM ICD-9-CM    1. Other left-sided nontraumatic intracerebral hemorrhage (HCC) I61.8 431 DUPLEX CAROTID BILATERAL      MRI BRAIN W WO CONT   2. Nontraumatic subcortical hemorrhage of left cerebral hemisphere (HCC) I61.0 431 DUPLEX CAROTID BILATERAL      MRI BRAIN W WO CONT   3. Nontraumatic subcortical hemorrhage of right cerebral hemisphere (HCC) I61.0 431 DUPLEX CAROTID BILATERAL      MRI BRAIN W WO CONT   4. Aphasia R47.01 784.3 DUPLEX CAROTID BILATERAL      MRI BRAIN W WO CONT   5. Bilateral carotid artery stenosis I65.23 433.10 DUPLEX CAROTID BILATERAL     433.30 MRI BRAIN W WO CONT   6. History of stroke Z86.73 V12.54 DUPLEX CAROTID BILATERAL      MRI BRAIN W WO CONT     Active Problems:    * No active hospital problems. *      Plan:     Patient with increased speech difficulty sounding suggestive of aphasia noted by his speech therapist, and a man who has had serious life-threatening previous strokes, needs MRI scan to rule out recurrent stroke, rule out recurrent bleed, rule out other structural or vascular anomaly as a cause of his symptoms. We will also check a carotid Doppler study. His blood pressure looks okay today, he has not noticed it out of control, so hopefully this will not be a bleed. He has had no other recent physical illness or other explanation for his speech problem.   Follow-up will be arranged in 3 months time or earlier if need be pending results of his MRI scan and carotid Doppler exam as above. He will call us immediately if there is any problem. Signed By: Gage Strong MD     March 6, 2020       CC: Richard Santiago MD  FAX: [phone]    This note will not be viewable in [address]

## 2020-03-06 NOTE — PATIENT INSTRUCTIONS
A Healthy Lifestyle: Care Instructions Your Care Instructions A healthy lifestyle can help you feel good, stay at a healthy weight, and have plenty of energy for both work and play. A healthy lifestyle is something you can share with your whole family. A healthy lifestyle also can lower your risk for serious health problems, such as high blood pressure, heart disease, and diabetes. You can follow a few steps listed below to improve your health and the health of your family. Follow-up care is a key part of your treatment and safety. Be sure to make and go to all appointments, and call your doctor if you are having problems. It's also a good idea to know your test results and keep a list of the medicines you take. How can you care for yourself at home? · Do not eat too much sugar, fat, or fast foods. You can still have dessert and treats now and then. The goal is moderation. · Start small to improve your eating habits. Pay attention to portion sizes, drink less juice and soda pop, and eat more fruits and vegetables. ? Eat a healthy amount of food. A 3-ounce serving of meat, for example, is about the size of a deck of cards. Fill the rest of your plate with vegetables and whole grains. ? Limit the amount of soda and sports drinks you have every day. Drink more water when you are thirsty. ? Eat at least 5 servings of fruits and vegetables every day. It may seem like a lot, but it is not hard to reach this goal. A serving or helping is 1 piece of fruit, 1 cup of vegetables, or 2 cups of leafy, raw vegetables. Have an apple or some carrot sticks as an afternoon snack instead of a candy bar. Try to have fruits and/or vegetables at every meal. 
· Make exercise part of your daily routine. You may want to start with simple activities, such as walking, bicycling, or slow swimming. Try to be active 30 to 60 minutes every day.  You do not need to do all 30 to 60 minutes all at once. For example, you can exercise 3 times a day for 10 or 20 minutes. Moderate exercise is safe for most people, but it is always a good idea to talk to your doctor before starting an exercise program. 
· Keep moving. Daniel Goltz the lawn, work in the garden, or [employer]. Take the stairs instead of the elevator at work. · If you smoke, quit. People who smoke have an increased risk for heart attack, stroke, cancer, and other lung illnesses. Quitting is hard, but there are ways to boost your chance of quitting tobacco for good. ? Use nicotine gum, patches, or lozenges. ? Ask your doctor about stop-smoking programs and medicines. ? Keep trying. In addition to reducing your risk of diseases in the future, you will notice some benefits soon after you stop using tobacco. If you have shortness of breath or asthma symptoms, they will likely get better within a few weeks after you quit. · Limit how much alcohol you drink. Moderate amounts of alcohol (up to 2 drinks a day for men, 1 drink a day for women) are okay. But drinking too much can lead to liver problems, high blood pressure, and other health problems. Family health If you have a family, there are many things you can do together to improve your health. · Eat meals together as a family as often as possible. · Eat healthy foods. This includes fruits, vegetables, lean meats and dairy, and whole grains. · Include your family in your fitness plan. Most people think of activities such as jogging or tennis as the way to fitness, but there are many ways you and your family can be more active. Anything that makes you breathe hard and gets your heart pumping is exercise. Here are some tips: 
? Walk to do errands or to take your child to school or the bus. 
? Go for a family bike ride after dinner instead of watching TV. Where can you learn more? Go to http://mi-pepito.info/. Enter W958 in the search box to learn more about \"A Healthy Lifestyle: Care Instructions. \" Current as of: May 28, 2019 Content Version: 12.2 © 2199-6846 [employer]. Care instructions adapted under license by [employer] (which disclaims liability or warranty for this information). If you have questions about a medical condition or this instruction, always ask your healthcare professional. [address] any warranty or liability for your use of this information. Office Policies 
 
o Phone calls/patient messages: Please allow up to 24 hours for someone in the office to contact you about your call or message. Be mindful your provider may be out of the office or your message may require further review. We encourage you to use [employer] for your messages as this is a faster, more efficient way to communicate with our office 
 
o Medication Refills: 
Prescription medications require up to 48 business hours to process. We encourage you to use [employer] for your refills. For controlled medications: Please allow up to 72 business hours to process. Certain medications may require you to  a written prescription at our office. NO narcotic/controlled medications will be prescribed after 4pm Monday through Friday or on weekends 
 
o Form/Paperwork Completion: We ask that you allow 7-14 business days. You may also download your forms to [employer] to have your doctor print off.

## 2020-03-06 NOTE — LETTER
3/6/2020 5:56 PM 
 
Patient:  Jenny Larry YOB: 1985 Date of Visit: 3/6/2020 Dear No Recipients: Thank you for referring Mr. Celestino Evans to me for evaluation/treatment. Below are the relevant portions of my assessment and plan of care. Consult REFERRED BY: 
Baldomero Peterson MD 
 
CHIEF COMPLAINT: Difficulty with speech and possible recurrent stroke Subjective:  
 
Jenny Larry is a 37 y.o. right-handed -American male we are seeing as a new patient to us, for evaluation of new problem of more difficulty with his speech, and referred by the speech therapist because of increasing speech difficulty thought to be perhaps related to a recurrent stroke in a patient who has had 2 intracranial hemorrhages from accelerated hypertension in the past.  The patient had his first hemorrhage involving the right basal ganglion area secondary to uncontrolled hypertension in 2014, and had a negative CTA and recovered almost completely from that illness. Then in 2016 he again got readmitted with uncontrolled hypertension and a large basal ganglion intracranial hemorrhage on the left, for which he has suffered with some mild aphasia and a fairly prominent right spastic hemiparesis but able to walk with a cane. Patient speech started to get worse about a month ago, for unclear reason. He denied any headache, or any other new focal weakness or sensory loss. His blood pressure has remained fairly well controlled. He had a repeat CTA done in 2016 also that showed no significant vascular anomaly. Patient denies any recent head trauma, fever, meningismus, new medication, any unusual stress or tension or any other cause of his speech problems. Past Medical History:  
Diagnosis Date  Anxiety  Chronic kidney disease   
 patient unsure  Hypertension  Stroke [facility]) 12/2014 & 01/2016 Past Surgical History:  
Procedure Laterality Date  HX ORTHOPAEDIC    
 rupture tendon  PLACE PERCUT GASTROSTOMY TUBE  2016 Family History Problem Relation Age of Onset  Lupus Mother  Cancer Paternal Uncle  Stroke Paternal Grandmother  Heart Disease Father  Drug Abuse Father  Liver Disease Father   
     hepatitis  Other Sister   
     short leg  Hypertension Sister Social History Tobacco Use  Smoking status: Former Smoker Packs/day: 0.00 Years: 10.00 Pack years: 0.00 Last attempt to quit: 10/2019 Years since quittin.4  Smokeless tobacco: Never Used Substance Use Topics  Alcohol use: Not Currently Alcohol/week: 2.5 standard drinks Types: 3 Standard drinks or equivalent per week Comment: occasional  
   
 
Current Outpatient Medications:  
  simvastatin (ZOCOR) 10 mg tablet, Take 10 mg by mouth nightly., Disp: , Rfl:  
  gabapentin (NEURONTIN) 300 mg capsule, TAKE 1 CAPSULE BY MOUTH TWICE DAILY, Disp: , Rfl:  
  baclofen 5 mg tab, 5 mg three (3) times daily. , Disp: , Rfl:  
  escitalopram oxalate (LEXAPRO) 20 mg tablet, Take 20 mg by mouth daily. , Disp: , Rfl:  
  amLODIPine (NORVASC) 5 mg tablet, TAKE 1 TABLET EVERY DAY, Disp: 90 Tab, Rfl: 0 
  metoprolol succinate (TOPROL-XL) 100 mg tablet, Take 1 Tab by mouth daily. , Disp: 90 Tab, Rfl: 3 No Known Allergies MRI Results (most recent): 
Results from Hospital Encounter encounter on 14 MRI BRAIN W WO CONT Narrative **Final Report** 
  
 
ICD Codes / Adm. Diagnosis:    / head bleed  Bleeding in head following inj 
Examination:  MR BRAIN W AND WO CON  - [id number] - May  7 2014  6:13PM 
Accession No:  [id number] Reason:  r/o small cavernoma or AVM as cause of bleed. REPORT: 
EXAM: MRI BRAIN 
 
INDICATION:   head bleed Bleeding in head following inj   r/o small  
cavernoma or AVM as cause of bleed. COMPARISON: Head CT today SEQUENCES:   Sagittal T1, axial T1, T2, GRE and FLAIR; axial and coronal T1  
 post enhancement; and diffusion imaging of the brain. 8 mL of Gadavist. 
 
FINDINGS: 
Right basal ganglia acute hemorrhage is unchanged since earlier head CT. There are 3 punctate foci of hemosiderin staining in the left basal ganglia,  
not apparent on CT. The brain parenchyma otherwise demonstrates normal morphology and signal  
intensity. The enhancement pattern is normal. 
 
There is no evidence for mass, shift, or hydrocephalus. There is no  
extra-axial fluid collection. Diffusion imaging shows no diffusion  
restriction to indicate acute infarct/ischemia or other process. IMPRESSION: Right basal ganglia hemorrhage without significant change since  
earlier CT. 3 punctate foci of left basal ganglia hemosiderin. No new  
findings. Signing/Reading Doctor: Sarah Mcleod ([id number]) ApprovedCaryle Macho ([id number])  May  7 2014  6:34PM                      
 
 
   
 
 
Results from Hospital Encounter encounter on 05/07/14 MRI BRAIN W WO CONT Narrative **Final Report** 
  
 
ICD Codes / Adm. Diagnosis:    / head bleed  Bleeding in head following inj 
Examination:  MR BRAIN W AND WO CON  - [id number] - May  7 2014  6:13PM 
Accession No:  [id number] Reason:  r/o small cavernoma or AVM as cause of bleed. REPORT: 
EXAM: MRI BRAIN 
 
INDICATION:   head bleed Bleeding in head following inj   r/o small  
cavernoma or AVM as cause of bleed. COMPARISON: Head CT today SEQUENCES:   Sagittal T1, axial T1, T2, GRE and FLAIR; axial and coronal T1  
post enhancement; and diffusion imaging of the brain. 8 mL of Gadavist. 
 
FINDINGS: 
Right basal ganglia acute hemorrhage is unchanged since earlier head CT. There are 3 punctate foci of hemosiderin staining in the left basal ganglia,  
not apparent on CT. The brain parenchyma otherwise demonstrates normal morphology and signal  
intensity.   The enhancement pattern is normal. 
 
 There is no evidence for mass, shift, or hydrocephalus. There is no  
extra-axial fluid collection. Diffusion imaging shows no diffusion  
restriction to indicate acute infarct/ischemia or other process. IMPRESSION: Right basal ganglia hemorrhage without significant change since  
earlier CT. 3 punctate foci of left basal ganglia hemosiderin. No new  
findings. Signing/Reading Doctor: Abi Lobato ([id number]) Josue Blackman ([id number])  May  7 2014  6:34PM                      
 
 
   
 
Review of Systems: A comprehensive review of systems was negative except for: Musculoskeletal: positive for muscle weakness Neurological: positive for speech problems, coordination problems, gait problems and weakness Behvioral/Psych: positive for anxiety and depression Vitals:  
 03/06/20 1327 BP: 130/90 Pulse: 71 Resp: 12 SpO2: 98% Weight: 166 lb (75.3 kg) Height: 5' 6\" (1.676 m) Objective: I 
 
 
NEUROLOGICAL EXAM: 
 
Appearance: The patient is well developed, well nourished, provides a coherent history and is in no acute distress. Mental Status: Oriented to time, place and person, and the president, cognitive function is normal and speech is slightly aphasic and mildly dysarthric. Mood and affect appropriate, but probably a little depressed. Cranial Nerves:   Intact visual fields. Fundi are benign, disc are flat, no lesions seen on funduscopy. CINDY, EOM's full, no nystagmus, no ptosis. Facial sensation is normal. Corneal reflexes are not tested. Facial movement is mildly weak on the right. Hearing is normal bilaterally. Palate is midline with normal sternocleidomastoid and trapezius muscles are normal. Tongue is midline. Neck without meningismus or bruits Temporal arteries are not tender or enlarged TMJ areas are not tender on palpation Motor:  5/5 strength in upper and lower proximal and distal muscles on the left, and patient has a moderate severe spastic right hemiplegia. Normal bulk and tone on the left, but very spastic and hypertonic on the right. No fasciculations. Rapid alternating movement is normal on the left, and markedly reduced on the right Reflexes:   Deep tendon reflexes 2+/4 on the left, and 3+/4 on the right. No babinski or clonus present Sensory:   Normal to touch, pinprick and vibration and temperature on the left but decreased on the right. DSS is decreased on the right Gait:  Abnormal gait with dense spastic right hemiplegia, and patient needs a cane for ambulation Tremor:   No tremor noted. Cerebellar: Moderately abnormal cerebellar signs present on Romberg and tandem testing and finger-nose-finger exam because of spastic right hemiplegia. Neurovascular:  Normal heart sounds and regular rhythm, peripheral pulses intact, and no carotid bruits. Assessment: ICD-10-CM ICD-9-CM 1. Other left-sided nontraumatic intracerebral hemorrhage (HCC) I61.8 431 DUPLEX CAROTID BILATERAL  
   MRI BRAIN W WO CONT 2. Nontraumatic subcortical hemorrhage of left cerebral hemisphere (HCC) I61.0 431 DUPLEX CAROTID BILATERAL  
   MRI BRAIN W WO CONT 3. Nontraumatic subcortical hemorrhage of right cerebral hemisphere (HCC) I61.0 431 DUPLEX CAROTID BILATERAL  
   MRI BRAIN W WO CONT 4. Aphasia R47.01 784.3 DUPLEX CAROTID BILATERAL  
   MRI BRAIN W WO CONT 5. Bilateral carotid artery stenosis I65.23 433.10 DUPLEX CAROTID BILATERAL  
  433.30 MRI BRAIN W WO CONT 6. History of stroke Z86.73 V12.54 DUPLEX CAROTID BILATERAL  
   MRI BRAIN W WO CONT Active Problems: * No active hospital problems.  * 
 
 
Plan:  
 
Patient with increased speech difficulty sounding suggestive of aphasia noted by his speech therapist, and a man who has had serious life-threatening previous strokes, needs MRI scan to rule out recurrent stroke, rule out recurrent bleed, rule out other structural or vascular anomaly as a cause of his symptoms. We will also check a carotid Doppler study. His blood pressure looks okay today, he has not noticed it out of control, so hopefully this will not be a bleed. He has had no other recent physical illness or other explanation for his speech problem. Follow-up will be arranged in 3 months time or earlier if need be pending results of his MRI scan and carotid Doppler exam as above. He will call us immediately if there is any problem. Signed By: Padmini Busch MD   
 March 6, 2020 CC: Joy Rojo [address]: [phone] This note will not be viewable in [address] If you have questions, please do not hesitate to call me. I look forward to following Mr. Trevor Joseph along with you. Sincerely, Padmini Busch MD

## 2020-03-06 NOTE — LETTER
3/6/2020 5:56 PM 
 
Patient:  Bonilla Holly YOB: 1985 Date of Visit: 3/6/2020 Dear No Recipients: Thank you for referring Mr. Celestino Evans to me for evaluation/treatment. Below are the relevant portions of my assessment and plan of care. Consult REFERRED BY: 
Zeinab Bentley MD 
 
CHIEF COMPLAINT: Difficulty with speech and possible recurrent stroke Subjective:  
 
Bonilla Holly is a 37 y.o. right-handed -American male we are seeing as a new patient to us, for evaluation of new problem of more difficulty with his speech, and referred by the speech therapist because of increasing speech difficulty thought to be perhaps related to a recurrent stroke in a patient who has had 2 intracranial hemorrhages from accelerated hypertension in the past.  The patient had his first hemorrhage involving the right basal ganglion area secondary to uncontrolled hypertension in 2014, and had a negative CTA and recovered almost completely from that illness. Then in 2016 he again got readmitted with uncontrolled hypertension and a large basal ganglion intracranial hemorrhage on the left, for which he has suffered with some mild aphasia and a fairly prominent right spastic hemiparesis but able to walk with a cane. Patient speech started to get worse about a month ago, for unclear reason. He denied any headache, or any other new focal weakness or sensory loss. His blood pressure has remained fairly well controlled. He had a repeat CTA done in 2016 also that showed no significant vascular anomaly. Patient denies any recent head trauma, fever, meningismus, new medication, any unusual stress or tension or any other cause of his speech problems. Past Medical History:  
Diagnosis Date  Anxiety  Chronic kidney disease   
 patient unsure  Hypertension  Stroke [facility]) 12/2014 & 01/2016 Past Surgical History:  
Procedure Laterality Date  HX ORTHOPAEDIC    
 rupture tendon  PLACE PERCUT GASTROSTOMY TUBE  2016 Family History Problem Relation Age of Onset  Lupus Mother  Cancer Paternal Uncle  Stroke Paternal Grandmother  Heart Disease Father  Drug Abuse Father  Liver Disease Father   
     hepatitis  Other Sister   
     short leg  Hypertension Sister Social History Tobacco Use  Smoking status: Former Smoker Packs/day: 0.00 Years: 10.00 Pack years: 0.00 Last attempt to quit: 10/2019 Years since quittin.4  Smokeless tobacco: Never Used Substance Use Topics  Alcohol use: Not Currently Alcohol/week: 2.5 standard drinks Types: 3 Standard drinks or equivalent per week Comment: occasional  
   
 
Current Outpatient Medications:  
  simvastatin (ZOCOR) 10 mg tablet, Take 10 mg by mouth nightly., Disp: , Rfl:  
  gabapentin (NEURONTIN) 300 mg capsule, TAKE 1 CAPSULE BY MOUTH TWICE DAILY, Disp: , Rfl:  
  baclofen 5 mg tab, 5 mg three (3) times daily. , Disp: , Rfl:  
  escitalopram oxalate (LEXAPRO) 20 mg tablet, Take 20 mg by mouth daily. , Disp: , Rfl:  
  amLODIPine (NORVASC) 5 mg tablet, TAKE 1 TABLET EVERY DAY, Disp: 90 Tab, Rfl: 0 
  metoprolol succinate (TOPROL-XL) 100 mg tablet, Take 1 Tab by mouth daily. , Disp: 90 Tab, Rfl: 3 No Known Allergies MRI Results (most recent): 
Results from Hospital Encounter encounter on 14 MRI BRAIN W WO CONT Narrative **Final Report** 
  
 
ICD Codes / Adm. Diagnosis:    / head bleed  Bleeding in head following inj 
Examination:  MR BRAIN W AND WO CON  - [id number] - May  7 2014  6:13PM 
Accession No:  [id number] Reason:  r/o small cavernoma or AVM as cause of bleed. REPORT: 
EXAM: MRI BRAIN 
 
INDICATION:   head bleed Bleeding in head following inj   r/o small  
cavernoma or AVM as cause of bleed. COMPARISON: Head CT today SEQUENCES:   Sagittal T1, axial T1, T2, GRE and FLAIR; axial and coronal T1  
 post enhancement; and diffusion imaging of the brain. 8 mL of Gadavist. 
 
FINDINGS: 
Right basal ganglia acute hemorrhage is unchanged since earlier head CT. There are 3 punctate foci of hemosiderin staining in the left basal ganglia,  
not apparent on CT. The brain parenchyma otherwise demonstrates normal morphology and signal  
intensity. The enhancement pattern is normal. 
 
There is no evidence for mass, shift, or hydrocephalus. There is no  
extra-axial fluid collection. Diffusion imaging shows no diffusion  
restriction to indicate acute infarct/ischemia or other process. IMPRESSION: Right basal ganglia hemorrhage without significant change since  
earlier CT. 3 punctate foci of left basal ganglia hemosiderin. No new  
findings. Signing/Reading Doctor: Gloria Caldera ([id number]) ApprovedJennett Lanes ([id number])  May  7 2014  6:34PM                      
 
 
   
 
 
Results from Hospital Encounter encounter on 05/07/14 MRI BRAIN W WO CONT Narrative **Final Report** 
  
 
ICD Codes / Adm. Diagnosis:    / head bleed  Bleeding in head following inj 
Examination:  MR BRAIN W AND WO CON  - [id number] - May  7 2014  6:13PM 
Accession No:  [id number] Reason:  r/o small cavernoma or AVM as cause of bleed. REPORT: 
EXAM: MRI BRAIN 
 
INDICATION:   head bleed Bleeding in head following inj   r/o small  
cavernoma or AVM as cause of bleed. COMPARISON: Head CT today SEQUENCES:   Sagittal T1, axial T1, T2, GRE and FLAIR; axial and coronal T1  
post enhancement; and diffusion imaging of the brain. 8 mL of Gadavist. 
 
FINDINGS: 
Right basal ganglia acute hemorrhage is unchanged since earlier head CT. There are 3 punctate foci of hemosiderin staining in the left basal ganglia,  
not apparent on CT. The brain parenchyma otherwise demonstrates normal morphology and signal  
intensity.   The enhancement pattern is normal. 
 
 There is no evidence for mass, shift, or hydrocephalus. There is no  
extra-axial fluid collection. Diffusion imaging shows no diffusion  
restriction to indicate acute infarct/ischemia or other process. IMPRESSION: Right basal ganglia hemorrhage without significant change since  
earlier CT. 3 punctate foci of left basal ganglia hemosiderin. No new  
findings. Signing/Reading Doctor: Wade Chaney ([id number]) Ana MMikerobert Calderon ([id number])  May  7 2014  6:34PM                      
 
 
   
 
Review of Systems: A comprehensive review of systems was negative except for: Musculoskeletal: positive for muscle weakness Neurological: positive for speech problems, coordination problems, gait problems and weakness Behvioral/Psych: positive for anxiety and depression Vitals:  
 03/06/20 1327 BP: 130/90 Pulse: 71 Resp: 12 SpO2: 98% Weight: 166 lb (75.3 kg) Height: 5' 6\" (1.676 m) Objective: I 
 
 
NEUROLOGICAL EXAM: 
 
Appearance: The patient is well developed, well nourished, provides a coherent history and is in no acute distress. Mental Status: Oriented to time, place and person, and the president, cognitive function is normal and speech is slightly aphasic and mildly dysarthric. Mood and affect appropriate, but probably a little depressed. Cranial Nerves:   Intact visual fields. Fundi are benign, disc are flat, no lesions seen on funduscopy. CINDY, EOM's full, no nystagmus, no ptosis. Facial sensation is normal. Corneal reflexes are not tested. Facial movement is mildly weak on the right. Hearing is normal bilaterally. Palate is midline with normal sternocleidomastoid and trapezius muscles are normal. Tongue is midline. Neck without meningismus or bruits Temporal arteries are not tender or enlarged TMJ areas are not tender on palpation Motor:  5/5 strength in upper and lower proximal and distal muscles on the left, and patient has a moderate severe spastic right hemiplegia. Normal bulk and tone on the left, but very spastic and hypertonic on the right. No fasciculations. Rapid alternating movement is normal on the left, and markedly reduced on the right Reflexes:   Deep tendon reflexes 2+/4 on the left, and 3+/4 on the right. No babinski or clonus present Sensory:   Normal to touch, pinprick and vibration and temperature on the left but decreased on the right. DSS is decreased on the right Gait:  Abnormal gait with dense spastic right hemiplegia, and patient needs a cane for ambulation Tremor:   No tremor noted. Cerebellar: Moderately abnormal cerebellar signs present on Romberg and tandem testing and finger-nose-finger exam because of spastic right hemiplegia. Neurovascular:  Normal heart sounds and regular rhythm, peripheral pulses intact, and no carotid bruits. Assessment: ICD-10-CM ICD-9-CM 1. Other left-sided nontraumatic intracerebral hemorrhage (HCC) I61.8 431 DUPLEX CAROTID BILATERAL  
   MRI BRAIN W WO CONT 2. Nontraumatic subcortical hemorrhage of left cerebral hemisphere (HCC) I61.0 431 DUPLEX CAROTID BILATERAL  
   MRI BRAIN W WO CONT 3. Nontraumatic subcortical hemorrhage of right cerebral hemisphere (HCC) I61.0 431 DUPLEX CAROTID BILATERAL  
   MRI BRAIN W WO CONT 4. Aphasia R47.01 784.3 DUPLEX CAROTID BILATERAL  
   MRI BRAIN W WO CONT 5. Bilateral carotid artery stenosis I65.23 433.10 DUPLEX CAROTID BILATERAL  
  433.30 MRI BRAIN W WO CONT 6. History of stroke Z86.73 V12.54 DUPLEX CAROTID BILATERAL  
   MRI BRAIN W WO CONT Active Problems: * No active hospital problems.  * 
 
 
Plan:  
 
Patient with increased speech difficulty sounding suggestive of aphasia noted by his speech therapist, and a man who has had serious life-threatening previous strokes, needs MRI scan to rule out recurrent stroke, rule out recurrent bleed, rule out other structural or vascular anomaly as a cause of his symptoms. We will also check a carotid Doppler study. His blood pressure looks okay today, he has not noticed it out of control, so hopefully this will not be a bleed. He has had no other recent physical illness or other explanation for his speech problem. Follow-up will be arranged in 3 months time or earlier if need be pending results of his MRI scan and carotid Doppler exam as above. He will call us immediately if there is any problem. Signed By: Fritz Spring MD   
 March 6, 2020 CC: Joy Dowd [address]: [phone] This note will not be viewable in [address] If you have questions, please do not hesitate to call me. I look forward to following Mr. Fong Solders along with you. Sincerely, Fritz Spring MD

## 2020-03-20 ENCOUNTER — TELEPHONE (OUTPATIENT)
Dept: PRIMARY CARE CLINIC | Age: 35
End: 2020-03-20

## 2020-10-12 ENCOUNTER — OFFICE VISIT (OUTPATIENT)
Dept: NEUROLOGY | Age: 35
End: 2020-10-12
Payer: MEDICARE

## 2020-10-12 VITALS
BODY MASS INDEX: 30.02 KG/M2 | DIASTOLIC BLOOD PRESSURE: 100 MMHG | OXYGEN SATURATION: 94 % | TEMPERATURE: 99 F | WEIGHT: 186 LBS | HEART RATE: 87 BPM | SYSTOLIC BLOOD PRESSURE: 140 MMHG

## 2020-10-12 DIAGNOSIS — R47.01 APHASIA: ICD-10-CM

## 2020-10-12 DIAGNOSIS — I61.8 OTHER LEFT-SIDED NONTRAUMATIC INTRACEREBRAL HEMORRHAGE (HCC): Primary | ICD-10-CM

## 2020-10-12 DIAGNOSIS — I69.351 HEMIPARESIS OF RIGHT DOMINANT SIDE AS LATE EFFECT OF CEREBRAL INFARCTION (HCC): ICD-10-CM

## 2020-10-12 DIAGNOSIS — I61.0 NONTRAUMATIC SUBCORTICAL HEMORRHAGE OF RIGHT CEREBRAL HEMISPHERE (HCC): ICD-10-CM

## 2020-10-12 DIAGNOSIS — I61.0 NONTRAUMATIC SUBCORTICAL HEMORRHAGE OF LEFT CEREBRAL HEMISPHERE (HCC): ICD-10-CM

## 2020-10-12 DIAGNOSIS — Z86.73 HISTORY OF STROKE: ICD-10-CM

## 2020-10-12 PROCEDURE — G8417 CALC BMI ABV UP PARAM F/U: HCPCS | Performed by: PSYCHIATRY & NEUROLOGY

## 2020-10-12 PROCEDURE — G8427 DOCREV CUR MEDS BY ELIG CLIN: HCPCS | Performed by: PSYCHIATRY & NEUROLOGY

## 2020-10-12 PROCEDURE — G9717 DOC PT DX DEP/BP F/U NT REQ: HCPCS | Performed by: PSYCHIATRY & NEUROLOGY

## 2020-10-12 PROCEDURE — 99215 OFFICE O/P EST HI 40 MIN: CPT | Performed by: PSYCHIATRY & NEUROLOGY

## 2020-10-12 RX ORDER — METOPROLOL SUCCINATE 50 MG/1
TABLET, EXTENDED RELEASE ORAL
COMMUNITY
Start: 2020-09-21

## 2020-10-12 NOTE — PATIENT INSTRUCTIONS
Overall you are stable and doing well from a neurologic perspective Continue to keep your blood pressure under good control and continue with your home exercise program to help manage the spasticity along with the gabapentin and the baclofen Check about Ruben Fischer redorothea so maybe you can get to the gym at least once a week We will see you back in the office in 6 months but we can switch to virtual if needed If there are any further signs or symptoms related to stroke please go to your nearest emergency room Learning About FAST: Stroke Warning Signs What is FAST? FAST is a simple way to remember the main symptoms of stroke. Recognizing these symptoms helps you know when to call for medical help. FAST stands for: · F ace drooping. · A rm weakness. · S peech difficulty. · T heraclio to call 911. Other stroke symptoms can include sudden confusion, a severe headache, and problems with vision or balance. What happens when you have a stroke? A stroke occurs when a blood vessel to the brain bursts or is blocked by a blood clot. Within minutes, the nerve cells in that part of the brain die. As a result, the part of the body controlled by those cells cannot work properly. The effects of a stroke may range from mild to severe. They may get better, or they may last the rest of your life. A stroke can affect vision, speech, behavior, thought processes, and your ability to move. It can cause symptoms that may include: 
· Sudden numbness, tingling, weakness, or loss of movement in your face, arm, or leg, especially on only one side of your body. · Sudden vision changes. · Sudden trouble speaking. · Sudden confusion or trouble understanding simple statements. · Sudden problems with walking or balance. · A sudden, severe headache that is different from past headaches. Why is it important to get help FAST? Quick treatment may save your life.  And it may reduce the damage in your brain so that you have fewer problems after the stroke. When you know stroke symptoms, you will know when it's important to call for medical help. Where can you learn more? Go to http://www.gray.com/ Enter C037 in the search box to learn more about \"Learning About FAST: Stroke Warning Signs. \" Current as of: March 4, 2020               Content Version: 12.6 © 2006-2020 [employer]. Care instructions adapted under license by [employer] (which disclaims liability or warranty for this information). If you have questions about a medical condition or this instruction, always ask your healthcare professional. [address] any warranty or liability for your use of this information.

## 2020-10-12 NOTE — PROGRESS NOTES
Rebecca Mullen is a 38 y.o. male who presents today for the following:  Chief Complaint   Patient presents with    Follow-up     right side arm weakness, balance issues, had 1 fall in the last year, physical therapy 10/2019 to 1/2020, speech problems    Stroke       This is an in office visit    HPI  Historical Data    Patient is known to the practice and has been previously seen by Dr. Roepr Devoid  Neurologic diagnosis  Other left-sided nontraumatic intracerebral hemorrhage  Nontraumatic subcortical hemorrhage of left cerebral hemisphere   Date of onset 2016   Large basal ganglion intracranial hemorrhage on the left   Residual aphasia   Right spastic hemiparesis [historically has been able to walk with a cane]   Hemorrhage related to uncontrolled accelerated hypertension  Nontraumatic subcortical hemorrhage of the right cerebral hemisphere   Right basal ganglion   Date of onset 2014   Uncontrolled accelerated hypertension as etiology  Right spastic hemiparesis  Aphasia  History of stroke  Bilateral ICA stenosis less than 15% bilaterally [March 2020]      Interim Data:     Patient returns to the office and stated he is doing pretty well. He still persist with his right-sided spastic hemiparesis and aphasia  But no new symptoms or problems  He continues to do home exercise stretching on a regular basis  Prior to Covid he was able to go to his gym  His gym is opened up but unfortunately Medicaid transportation will not take him to the gym and previously he was relying on [employer]    He states his spasticity is reasonably well controlled with a combination of baclofen and gabapentin.   Presently is taking the baclofen 5 mg once in the morning but can take it up to 3 times a day as needed and he continues on his gabapentin routinely    He states his blood pressure is well controlled on current medications  He continues to follow in no salt diet  He does not smoke  He avoids fatty foods  And tries to exercise when he can    There are no new or worsening signs or symptoms consistent with cerebrovascular insufficiency    No Known Allergies    Current Outpatient Medications   Medication Sig    metoprolol succinate (TOPROL-XL) 50 mg XL tablet 1 tab(s)    simvastatin (ZOCOR) 10 mg tablet Take 10 mg by mouth nightly.  gabapentin (NEURONTIN) 300 mg capsule TAKE 1 CAPSULE BY MOUTH TWICE DAILY    baclofen 5 mg tab 5 mg three (3) times daily.  escitalopram oxalate (LEXAPRO) 20 mg tablet Take 20 mg by mouth daily.  amLODIPine (NORVASC) 5 mg tablet TAKE 1 TABLET EVERY DAY (Patient taking differently: 2.5 mg. TAKE 1 TABLET EVERY DAY)     No current facility-administered medications for this visit.         Past Medical History:   Diagnosis Date    Anxiety     Chronic kidney disease     patient unsure    Hypertension     Stroke [facility]) 2014 & 2016       Past Surgical History:   Procedure Laterality Date    HX ORTHOPAEDIC      rupture tendon    PLACE PERCUT GASTROSTOMY TUBE  2016            Family History   Problem Relation Age of Onset    Lupus Mother     Cancer Paternal Uncle     Stroke Paternal Grandmother     Heart Disease Father     Drug Abuse Father     Liver Disease Father         hepatitis    Other Sister         short leg    Hypertension Sister        Social History     Socioeconomic History    Marital status: SINGLE     Spouse name: Not on file    Number of children: Not on file    Years of education: Not on file    Highest education level: Not on file   Tobacco Use    Smoking status: Former Smoker     Packs/day: 0.00     Years: 10.00     Pack years: 0.00     Last attempt to quit: 10/2019     Years since quittin.0    Smokeless tobacco: Never Used   Substance and Sexual Activity    Alcohol use: Not Currently     Alcohol/week: 2.5 standard drinks     Types: 3 Standard drinks or equivalent per week     Comment: occasional    Drug use: No    Sexual activity: Yes         ROS  Other than what is stated above under HPI there is no new or changing issues related to the following:  Constitutional: No recent weight change, fever,fatigue, sleep difficulties, or loss of appetite. ENT/Mouth:  No hearing loss, ringing in the ears, chronic sinus problem, nose bleeds sore throat, voice change, hoarseness, swollen glands in neck, or difficulties with chewing and swallowing. Cardiovascular:  No chest pain/angina pectoris, palpitations,swelling of feet/ankles/hands, or calf pain while walking. Respiratory: No chronic or frequent coughs, spitting up blood, shortness of breath, asthma, or wheezing. Gastrointestinal: No abdominal pain, heartburn, nausea, vomiting, constipation, frequent diarrhea, rectal bleeding, or blood in stool. Genitourinary: No frequent urination, burning or painful urination, blood in urine, incontinence or dribbling. Musculoskeletal:   No joint pain, stiffness/swelling, weakness of muscles, muscle pain/cramp, or back pain. Integument:   No rash/itching, change in skin color, change in hair/nails, or change in color/size of moles. Neurological:  No dizziness/vertigo, loss of consciousness, numbness/tingling sensation, tremors, weakness in limbs, difficulty with balance, frequent or recurring headaches, memory loss or confusion. Psychiatric:   No nervousness, depression, hallucinations, paranoia or suspiciousness. Endocrine: No excessive thirst or urination, heat or cold intolerance. Hematologic/Lymphatic: No bleeding/bruising tendency, phlebitis, or past transfusion. EXAMINATION  Visit Vitals  BP (!) 140/100   Pulse 87   Temp 99 °F (37.2 °C)   Wt 186 lb (84.4 kg)   SpO2 94%   BMI 30.02 kg/m²            General appearance: Patient is well-developed and well-nourished in no apparent distress and well groomed.     Psych/mental health:  Affect: Appropriate    HEENT: Normocephalic, without evidence of trauma  Full range of motion, no tenderness to palpation in the head or neck region     Cardiovascular: Extremities warm to touch, no swelling appreciated    Respiratory: No dyspnea on exertion noted, normal effort on casual observation    Musculoskeletal: No evidence of significant bony deformities or spinal curvature    Integumentary:  No obvious bruising, lacerations or discoloaration on casual observation. Neurological Examination:   Mental Status:        MMSE  No flowsheet data found. Formal testing was not completed    there was nothing concerning on general observation and discussion. Alert oriented and appropriate to general conversation  Normal processing on general observation  Followed conversation and responded seemingly appropriate throughout the office visit  + + Word finding difficulties but able to make his needs known  Able to follow directions without difficulty     Cranial Nerves:    PERRLA,   EOMs intact gaze is conjugate  No nystagmus is appreciated  No MATHIEU  Facial motor and sensory intact bilaterally  Hearing intact to conversation  Voice with normal projection, no evidence of secretion pooling  Palate elevates symmetrically  Shoulder shrug intact bilaterally  No tongue deviation appreciated     Motor:   Normal tone and bulk  Fine dexterity intact bilaterally  No tremor appreciated on today's exam  No abnormal movements appreciated on today's exam    Muscle strength testing:  Right side  Upper extremities  Significant spasticity  Cannot move without assistance of his left arm    Lower extremity  Spastic hemiparesis but able to ambulate with the use of 1 point      Left side  Upper extremity  Deltoid 5/5  Bicep 5/5  Tricep 5/5  Hand grasp 5/5    Lower extremity  Hip flexor 5/5  Extensor 5/5  Flexor 5/5  Plantar flexion 5/5  Dorsiflexion 5/5    Sensation: Intact to light touch bilaterally    Coordination/Cerebellar:   Grossly intact    Gait: Ambulates with the use of 1 point cane    Fall risk assessment  No flowsheet data found.     Reflexes: Not tested    ASSESSMENT AND PLAN    Patient is known to this practice. This is my first time seeing the patient. Chart and history reviewed in detail at today's office visit. Several catastrophic strokes related to uncontrolled accelerated hypertension  Blood pressure is a bit elevated today's office visit  Patient is continue without behavioral management related to managing blood pressure and follow-up with primary care regarding medication management    Right-sided spastic hemiparesis  Stable  Able to ambulate with the use of a cane  Reliant on the use of baclofen and gabapentin to manage his spasticity so he is able to ambulate  Patient is continue with a home exercise program and went over available I encouraged him to go back to the gym at least once a week    Aphasia  Still with word finding difficulties but able to make his needs known        ICD-10-CM ICD-9-CM    1. Other left-sided nontraumatic intracerebral hemorrhage (HCC)  I61.8 431    2. Nontraumatic subcortical hemorrhage of left cerebral hemisphere (HCC)  I61.0 431    3. Nontraumatic subcortical hemorrhage of right cerebral hemisphere (HCC)  I61.0 431    4. History of stroke  Z86.73 V12.54    5. Aphasia  R47.01 784.3    6. Hemiparesis of right dominant side as late effect of cerebral infarction [facility])  I69.351 438.21            Additional pertinent medical data reviewed at today's office visit:       No visits with results within 3 Month(s) from this visit.    Latest known visit with results is:   Office Visit on 10/03/2017   Component Date Value    Cholesterol, total 10/03/2017 179     Triglyceride 10/03/2017 112     HDL Cholesterol 10/03/2017 50     VLDL, calculated 10/03/2017 22     LDL, calculated 10/03/2017 107*    Glucose 10/03/2017 94     BUN 10/03/2017 13     Creatinine 10/03/2017 1.03     GFR est non-AA 10/03/2017 94     GFR est AA 10/03/2017 109     BUN/Creatinine ratio 10/03/2017 13     Sodium 10/03/2017 140     Potassium 10/03/2017 4.1     Chloride 10/03/2017 101     CO2 10/03/2017 24     Calcium 10/03/2017 9.9     Protein, total 10/03/2017 7.0     Albumin 10/03/2017 4.2     GLOBULIN, TOTAL 10/03/2017 2.8     A-G Ratio 10/03/2017 1.5     Bilirubin, total 10/03/2017 0.6     Alk. phosphatase 10/03/2017 75     AST (SGOT) 10/03/2017 13     ALT (SGPT) 10/03/2017 11     INTERPRETATION 10/03/2017 Note        XR Results (maximum last 3): Results from [place] encounter on 02/15/16   XR SWALLOW FUNC VIDEO   Results from Hospital Encounter encounter on 01/17/16   XR CHEST PORT   XR ABD PORT  1 V       CT Results (maximum last 3): Results from [place] encounter on 06/19/17   CT HEAD WO CONT    Impression IMPRESSION: No acute intracranial hemorrhage, mass or infarct. Results from [place] encounter on 01/17/16   CT HEAD WO CONT   CT HEAD WO CONT       MRI Results (maximum last 3): Results from [place] encounter on 05/07/14   MRI BRAIN W WO CONT         Follow-up and Dispositions    · Return in about 6 months (around 4/12/2021) for In office appointment.            Bryan Sandoval MS, ANP-BC, [facility]

## 2021-04-14 ENCOUNTER — TRANSCRIBE ORDER (OUTPATIENT)
Dept: SCHEDULING | Age: 36
End: 2021-04-14

## 2021-04-14 DIAGNOSIS — I10 ESSENTIAL HYPERTENSION, MALIGNANT: Primary | ICD-10-CM

## 2021-04-19 ENCOUNTER — OFFICE VISIT (OUTPATIENT)
Dept: NEUROLOGY | Age: 36
End: 2021-04-19
Payer: MEDICAID

## 2021-04-19 VITALS
DIASTOLIC BLOOD PRESSURE: 87 MMHG | SYSTOLIC BLOOD PRESSURE: 127 MMHG | HEIGHT: 66 IN | TEMPERATURE: 98.4 F | OXYGEN SATURATION: 97 % | WEIGHT: 191 LBS | BODY MASS INDEX: 30.7 KG/M2 | HEART RATE: 75 BPM

## 2021-04-19 DIAGNOSIS — I61.0 NONTRAUMATIC SUBCORTICAL HEMORRHAGE OF LEFT CEREBRAL HEMISPHERE (HCC): Primary | ICD-10-CM

## 2021-04-19 DIAGNOSIS — I69.351 HEMIPARESIS OF RIGHT DOMINANT SIDE AS LATE EFFECT OF CEREBRAL INFARCTION (HCC): ICD-10-CM

## 2021-04-19 DIAGNOSIS — R47.01 APHASIA: ICD-10-CM

## 2021-04-19 PROCEDURE — 99215 OFFICE O/P EST HI 40 MIN: CPT | Performed by: PSYCHIATRY & NEUROLOGY

## 2021-04-19 NOTE — PROGRESS NOTES
[facility] 81  In Office FOLLOW-UP VISIT         Duy Knight is a 43 y.o. male who presents today for the following:  Chief Complaint   Patient presents with    Follow-up    Stroke         ASSESSMENT AND PLAN  S/p hemorrhagic stroke  Without recurrence of symptoms  Continues to modify risk factors   He continues on blood pressure medicine and simvastatin    Residual hemiparesis stable on gabapentin and baclofen prescribed by PCP  Continues to do home exercise program    Aphasia  Still with word finding difficulties but able to make his needs and concerns known    At this point there is nothing additional we are doing for this patient. Medications are through PCP  PCP can order carotid duplex studies to be done bi-annually to monitor carotid artery stenosis    Additionally transportation is a major issue for this patient  It is reasonable for us to just see him back as needed    Patient verbalizes agreement with the plan    Patient has been encouraged to continue to follow-up with mental health as well on a continuous basis      ICD-10-CM ICD-9-CM    1. Nontraumatic subcortical hemorrhage of left cerebral hemisphere (HCC)  I61.0 431    2. Aphasia  R47.01 784.3    3. Hemiparesis of right dominant side as late effect of cerebral infarction [facility])  I69.351 438.21        I attest that 40 minutes was spent on today's visit reviewing medical records and diagnostic testing deemed pertinent to this patient's care, along with direct time spent at patient's visit including the history, physical assessment and plan, discussing diagnosis and management along with documentation.       HPI  Historical Data    Patient is known to the practice and has been previously seen by Dr. Ronnie Pires  Neurologic diagnosis  Other left-sided nontraumatic intracerebral hemorrhage  Nontraumatic subcortical hemorrhage of left cerebral hemisphere             Date of onset 2016             Large basal ganglion intracranial hemorrhage on the left             Residual aphasia             Right spastic hemiparesis [historically has been able to walk with a cane]             Hemorrhage related to uncontrolled accelerated hypertension  Nontraumatic subcortical hemorrhage of the right cerebral hemisphere             Right basal ganglion             Date of onset 2014             Uncontrolled accelerated hypertension as etiology  Right spastic hemiparesis  Aphasia  History of stroke  Bilateral carotid artery stenosis less than 50%   So without hemodynamically significant stenosis    Duplex studies from March 2020: This study consisted of pulsed wave Doppler examination, color flow imaging, and duplex imaging of both right and left carotid systems in both vertebral arteries     Imaging of both right and left carotid systems showed mild mixed plaque in the bifurcations and proximal and distal internal carotid arteries bilaterally with stenosis in the range of 16-49% only and with no flow abnormalities identified. Both external carotid arteries and both common carotid arteries showed showed normal antegrade flow, and showed mild disease in the range of 0-15% only without associated flow abnormalities.     Both vertebral arteries showed normal antegrade flow.     Clinical correlation recommended.        Interim Data:      Patient returns to the office and stated he is doing pretty well. He still persist with his right-sided spastic hemiparesis and aphasia  But no new symptoms or problems  He continues to do home exercise stretching on a regular basis     He states his spasticity is reasonably well controlled with a combination of baclofen and gabapentin.   Presently is taking the baclofen 5 mg once in the morning but can take it up to 3 times a day as needed and he continues on his gabapentin routinely     He states his blood pressure is well controlled on current medications  He continues to follow in no salt diet  He does not smoke  He avoids fatty foods  And tries to exercise when he can     There are no new or worsening signs or symptoms consistent with cerebrovascular insufficiency           Results from Hospital Encounter encounter on 05/07/14   MRI BRAIN W WO CONT       No Known Allergies    Current Outpatient Medications   Medication Sig    metoprolol succinate (TOPROL-XL) 50 mg XL tablet 1 tab(s)    simvastatin (ZOCOR) 10 mg tablet Take 10 mg by mouth nightly.  gabapentin (NEURONTIN) 300 mg capsule TAKE 1 CAPSULE BY MOUTH TWICE DAILY    baclofen 5 mg tab 5 mg three (3) times daily.  escitalopram oxalate (LEXAPRO) 20 mg tablet Take 20 mg by mouth daily.  amLODIPine (NORVASC) 5 mg tablet TAKE 1 TABLET EVERY DAY (Patient taking differently: 2.5 mg. TAKE 1 TABLET EVERY DAY)     No current facility-administered medications for this visit. Past medical history/surgical history, family history, and social history have been reviewed for today's visit      ROS    A ten system review of constitutional, cardiovascular, respiratory, musculoskeletal, endocrine, skin, SHEENT, genitourinary, psychiatric and neurologic systems was obtained and is unremarkable except as mentioned under HPI          EXAMINATION:       Visit Vitals  /87   Pulse 75   Temp 98.4 °F (36.9 °C)   Ht 5' 6\" (1.676 m)   Wt 191 lb (86.6 kg)   SpO2 97%   BMI 30.83 kg/m²         General appearance: Patient is well-developed and well-nourished in no apparent distress and well groomed.     Psych/mental health:  Affect: Appropriate    PHQ  3 most recent PHQ Screens 10/3/2017   Little interest or pleasure in doing things Several days   Feeling down, depressed, irritable, or hopeless Not at all   Total Score PHQ 2 1   Trouble falling or staying asleep, or sleeping too much Not at all   Feeling tired or having little energy Several days   Poor appetite, weight loss, or overeating Not at all   Feeling bad about yourself - or that you are a failure or have let yourself or your family down Not at all   Trouble concentrating on things such as school, work, reading, or watching TV Not at all   Moving or speaking so slowly that other people could have noticed; or the opposite being so fidgety that others notice Not at all   Thoughts of being better off dead, or hurting yourself in some way Not at all   PHQ 9 Score 2       HEENT:   Normocephalic  With evidence of trauma: No  Full range of motion head neck: Yes  Tenderness to palpation of the head neck region: No      Cardiovascular:     Extremities warm to touch: Yes  Extremity swelling: No  Discoloration: No  Evidence of PVD: No    Respiratory:   Dyspnea on exertion: No   Abnormal effort on casual observation: No   Use of portable oxygen: No   Evidence of cyanosis: No     Musculoskeletal:   Evidence of significant bone deformities: No   Spinal curvature: No     Integumentary:    Obvious bruising: No   Lacerations or discoloration on casual observation: No       Neurological Examination:   Mental Status:        MMSE    Formal testing was not completed    there was nothing concerning on general observation and discussion.    Alert oriented and appropriate to general conversation  Normal processing on general observation  Followed conversation and responded seemingly appropriate throughout the office visit  + + Word finding difficulties but able to make his needs known  Able to follow directions without difficulty     Cranial Nerves:    EOMs intact gaze is conjugate  No nystagmus is appreciated  Facial motor intact bilaterally  Hearing intact to conversation  Voice with normal projection, no evidence of secretion pooling  Shoulder shrug intact bilaterally  No tongue deviation appreciated     Motor:   Right side  Upper extremities  Significant spasticity  Gross motor movements only and with limited range of motion; partial contractures  No fine dexterity     Lower extremity  Spastic hemiparesis but able to ambulate with the use of 1 point        Sensation: Intact to light touch    Coordination/Cerebellar:   Grossly intact    Gait: Ambulates with use of 1 point cane    Reflexes: Not tested    Fall risk assessment  No flowsheet data found. Follow-up and Dispositions    · Return if symptoms worsen or fail to improve.            Damian Brownlee MS, ANP-BC, [facility]

## 2021-04-19 NOTE — PATIENT INSTRUCTIONS
As per our discussion Your blood pressure today was excellent at 127/87 Continue to exercise Continue to follow-up with your mental health folks and your primary care provider as appropriate Since you are getting your prescriptions from baclofen and gabapentin from your primary care provider and things are stable we will just see you back as needed But we are happy to see you back should the situation change I strongly encourage you to use my chart if you need to contact us. Presently with a high utilization of telehealth it is very difficult to get through on her phone lines. If you have not already activated my chart or you forget your password their instructions in this packet to get my chart activated. Office Policies 
 
o Phone calls/patient messages: Please allow up to 24 hours for someone in the office to contact you about your call or message. Be mindful your provider may be out of the office or your message may require further review. We encourage you to use [employer] for your messages as this is a faster, more efficient way to communicate with our office 
 
o Medication Refills: 
Prescription medications require up to 48 business hours to process. We encourage you to use [employer] for your refills. For controlled medications: Please allow up to 72 business hours to process. Certain medications may require you to  a written prescription at our office. NO narcotic/controlled medications will be prescribed after 4pm Monday through Friday or on weekends 
 
o Form/Paperwork Completion: We ask that you allow 7-14 business days. You may also download your forms to [employer] to have your doctor print off. Eating Healthy Foods: Care Instructions Your Care Instructions Eating healthy foods can help lower your risk for disease. Healthy food gives you energy and keeps your heart strong, your brain active, your muscles working, and your bones strong.  
A healthy diet includes a variety of foods from the basic food groups: grains, vegetables, fruits, milk and milk products, and meat and beans. Some people may eat more of their favorite foods from only one food group and, as a result, miss getting the nutrients they need. So, it is important to pay attention not only to what you eat but also to what you are missing from your diet. You can eat a healthy, balanced diet by making a few small changes. Follow-up care is a key part of your treatment and safety. Be sure to make and go to all appointments, and call your doctor if you are having problems. It's also a good idea to know your test results and keep a list of the medicines you take. How can you care for yourself at home? Look at what you eat · Keep a food diary for a week or two and record everything you eat or drink. Track the number of servings you eat from each food group. · For a balanced diet every day, eat a variety of: 
? 6 or more ounce-equivalents of grains, such as cereals, breads, crackers, rice, or pasta, every day. An ounce-equivalent is 1 slice of bread, 1 cup of ready-to-eat cereal, or ½ cup of cooked rice, cooked pasta, or cooked cereal. 
? 2½ cups of vegetables, especially: § Dark-green vegetables such as broccoli and spinach. § Orange vegetables such as carrots and sweet potatoes. § Dry beans (such as pineda and kidney beans) and peas (such as lentils). ? 2 cups of fresh, frozen, or canned fruit. A small apple or 1 banana or orange equals 1 cup. ? 3 cups of nonfat or low-fat milk, yogurt, or other milk products. ? 5½ ounces of meat and beans, such as chicken, fish, lean meat, beans, nuts, and seeds. One egg, 1 tablespoon of peanut butter, ½ ounce nuts or seeds, or ¼ cup of cooked beans equals 1 ounce of meat. · Learn how to read food labels for serving sizes and ingredients. Fast-food and convenience-food meals often contain few or no fruits or vegetables.  Make sure you eat some fruits and vegetables to make the meal more nutritious. · Look at your food diary. For each food group, add up what you have eaten and then divide the total by the number of days. This will give you an idea of how much you are eating from each food group. See if you can find some ways to change your diet to make it more healthy. Start small · Do not try to make dramatic changes to your diet all at once. You might feel that you are missing out on your favorite foods and then be more likely to fail. · Start slowly, and gradually change your habits. Try some of the following: ? Use whole wheat bread instead of white bread. ? Use nonfat or low-fat milk instead of whole milk. ? Eat brown rice instead of white rice, and eat whole wheat pasta instead of white-flour pasta. ? Try low-fat cheeses and low-fat yogurt. ? Add more fruits and vegetables to meals and have them for snacks. ? Add lettuce, tomato, cucumber, and onion to sandwiches. ? Add fruit to yogurt and cereal. 
Enjoy food · You can still eat your favorite foods. You just may need to eat less of them. If your favorite foods are high in fat, salt, and sugar, limit how often you eat them, but do not cut them out entirely. · Eat a wide variety of foods. Make healthy choices when eating out · The type of restaurant you choose can help you make healthy choices. Even fast-food chains are now offering more low-fat or healthier choices on the menu. · Choose smaller portions, or take half of your meal home. · When eating out, try: ? A veggie pizza with a whole wheat crust or grilled chicken (instead of sausage or pepperoni). ? Pasta with roasted vegetables, grilled chicken, or marinara sauce instead of cream sauce. ? A vegetable wrap or grilled chicken wrap. ? Broiled or poached food instead of fried or breaded items. Make healthy choices easy · Buy packaged, prewashed, ready-to-eat fresh vegetables and fruits, such as baby carrots, salad mixes, and chopped or shredded broccoli and cauliflower. · Buy packaged, presliced fruits, such as melon or pineapple. · Choose 100% fruit or vegetable juice instead of soda. Limit juice intake to 4 to 6 oz (½ to ¾ cup) a day. · Blend low-fat yogurt, fruit juice, and canned or frozen fruit to make a smoothie for breakfast or a snack. Where can you learn more? Go to http://mi-pepito.info/ Enter T756 in the search box to learn more about \"Eating Healthy Foods: Care Instructions. \" Current as of: December 17, 2020               Content Version: 12.8 © 2875-6632 Healthwise, [employer]. Care instructions adapted under license by [employer] (which disclaims liability or warranty for this information). If you have questions about a medical condition or this instruction, always ask your healthcare professional. [facility] any warranty or liability for your use of this information.

## 2021-05-03 ENCOUNTER — HOSPITAL ENCOUNTER (OUTPATIENT)
Dept: CARDIAC REHAB | Age: 36
Discharge: HOME | End: 2021-05-03
Attending: NURSE PRACTITIONER
Payer: MEDICAID

## 2021-05-03 VITALS — BODY MASS INDEX: 30.7 KG/M2 | HEIGHT: 66 IN | WEIGHT: 191 LBS

## 2021-05-03 DIAGNOSIS — I10 ESSENTIAL HYPERTENSION, MALIGNANT: ICD-10-CM

## 2021-05-03 PROCEDURE — 97802 MEDICAL NUTRITION INDIV IN: CPT | Performed by: DIETITIAN, REGISTERED

## 2021-05-03 NOTE — PROGRESS NOTES
[address] NOTE  DATE: 5/3/2021      REFERRING PHYSICIAN:  Dr. Sierra Amharic    NAME: Steph Wade : 1985 AGE: 43 y.o. GENDER: male    REASON FOR VISIT: Hypertension and BMI >30    PAST MEDICAL HISTORY:   Patient Active Problem List   Diagnosis Code    ICH (intracerebral hemorrhage) ([facility] I61.9    CVA (cerebrovascular accident due to intracerebral hemorrhage) ([facility] I61.9    Counseling regarding advanced directives and goals of care Z71.89    Physical debility R53.81    Anxiety F41.9    Bipolar 1 disorder ([facility] F31.9    Nontraumatic subcortical hemorrhage of right cerebral hemisphere ([facility] I61.0    Aphasia R47.01    Bilateral carotid artery stenosis I65.23    History of stroke Z86.73     Stroke in 2016 (due to high blood pressure) with residual strength & mobility limitations in the right side; left leg shakes out of habit    LABS:   Lab Results   Component Value Date/Time    Cholesterol, total 179 10/03/2017 12:34 PM    HDL Cholesterol 50 10/03/2017 12:34 PM    LDL, calculated 107 (H) 10/03/2017 12:34 PM    VLDL, calculated 22 10/03/2017 12:34 PM    Triglyceride 112 10/03/2017 12:34 PM    CHOL/HDL Ratio 4.6 2016 04:31 AM     No results found for: HBA1C, HGBE8, QVB3IEOS, UOK2OUAC    Checks BP at home with wrist monitor. Reports BP is a little higher at home than in the MD office but in both cases WDL. MEDICATIONS/SUPPLEMENTS:   [unfilled]  Prior to Admission medications    Medication Sig Start Date End Date Taking? Authorizing Provider   metoprolol succinate (TOPROL-XL) 50 mg XL tablet 1 tab(s) 20   Provider, Historical   simvastatin (ZOCOR) 10 mg tablet Take 10 mg by mouth nightly. 20   Provider, Historical   gabapentin (NEURONTIN) 300 mg capsule TAKE 1 CAPSULE BY MOUTH TWICE DAILY 20   Provider, Historical   baclofen 5 mg tab 5 mg three (3) times daily. Provider, Historical   escitalopram oxalate (LEXAPRO) 20 mg tablet Take 20 mg by mouth daily. 12/5/18   Provider, Historical   amLODIPine (NORVASC) 5 mg tablet TAKE 1 TABLET EVERY DAY  Patient taking differently: 2.5 mg. TAKE 1 TABLET EVERY DAY 7/12/17   Tammy Jacobo MD     Taking OTC probiotic    EXERCISE/PHYSICAL ACTIVITY: he does physical therapy exercises at home, he walks w/ and w/o a cane    ALCOHOL / TOBACCO USE: alcohol 3 times per week, one shot; quit smoking a year ago    SOCIAL HISTORY: on disability but would like to return to work; currently lives with his sister and her two children; he has transportation limitations    REPORTED WEIGHT HISTORY: \"weight gain after stopped smoking and started eating apples - 4 or 5 per day\"        ANTHROPOMETRICS:    Ht Readings from Last 1 Encounters:   05/03/21 5' 6\" (1.676 m)      Wt Readings from Last 10 Encounters:   05/03/21 86.6 kg (191 lb)   04/19/21 86.6 kg (191 lb)   10/12/20 84.4 kg (186 lb)   03/06/20 75.3 kg (166 lb)   10/03/17 68.9 kg (151 lb 12.8 oz)   07/03/17 71.2 kg (157 lb)   06/19/17 74.8 kg (165 lb)   04/03/17 76.7 kg (169 lb)   12/02/16 77.1 kg (170 lb)   10/26/16 74.8 kg (165 lb)      IBW:142 # +/- 10%  %IBW: 135 % +/- 10%    BMI: 30.8 kg/M2  Category: obese  (Note: pt wearing tennis shoes)          NUTRITION ASSESSMENT:    FOOD ALLERGIES/INTOLERANCES: no known food allergies    24 HOUR DIET RECALL  Breakfast     Snack     Lunch  noon Apple, 28 oz bottle Gatorade Zero   Snack  A little over 1 cup of Popcorn (butter flavor, prepopped)   Dinner  7 pm [facility] wrap from [employer], crab chips (potato chips with [place] seasoning)   Snack       Celestino Evans denies any trouble with chew / swallow. He does his own grocery shopping & cooking. Has cut back to 1 or 2 apples vs 4 or 5. He usually skips breakfast. Lunch is generlaly more of a snack like apples or popcorn. States ever since he was a teenager one meal a day has been his routine. Dinner from [employer] was not typical, used to eat rice with fish or homemade wrap.  Eats mostly fish and chicken with red meat just on the weekend. Vegetable intake varies, could be daily or much less. Eats [place] bars or dessert once or twice a week. Susan Martínez can be late sometimes - 8 or 9 pm - and he thinks this might be his problem. Also states sometimes he eats more of a snack, like popcorn, vs a meal. He does not read food labels when grocery shops because eats pretty much the same thing. Uses gum, mints (maybe 3 or 4 soft peppermints per day) to help replace feeling of smoking. For awhile drank Ensure for breakfast   and liked it but has not had in a while. NUTRITION DIAGNOSIS:  Food and nutrition related knowledge deficit related to lack of nutrition education as evidenced by pt request for RD education and counseling. ESTIMATED ENERGY NEEDS:  2337  (using Chuy Asuncion with AF 1.35); - 500 for weight loss at rate of 1 lb per week    NUTRITION INTERVENTION:  Nutrition 60 minute one-on-one education & goal setting with Raúl Inman    Reviewed with Raúl Inman relevant labs compared to ideals.     Reviewed weight history and patient's verbalized weight goal as well as any real or perceived barriers to obtaining the goal. Collaborated with patient to set a specific short and long term weight goal.     Conducted a verbal diet recall and assessed for environmental, financial, psychosocial, physical and comorbidities that may impact the food and eating patterns / behaviors of [address] [place] [address] with patient to set specific nutrient goals as well as specific food / behavior changes that will help patient meet the overall goal of: weight loss and BP management    NUTRITION EDUCATION / HANDOUTS PROVIDED:  - Label reading for calories and sodium  - Academy of Nutrition and Dietetics patient education: \"Stroke Nutrition Therapy\" and \"Hypertension Nutrition Therapy\", including recommended food lists & salt free seasoning suggestions  - Academy of Nutrition and Dietetics patient education: \"Weight Loss Tips\" and \"Weight Management Cooking Tips\"  - Balance / Diabetes Plate Method      PATIENT GOALS:    Weight Goals:    Long Term Weight Goal: 160 lbs    Nutrition Goals:    Daily Recommendations:  Calories: 1800 /day   Sodium: no more than 1500 mg/day  Fruit: 2-2.5 cups / day  Vegetables: half the plate at meals      - read and compare food labels  - OK to use Ensure or a Healthy Choice frozen entree for a meal (vs skip, snack or dine out)  - eat dinner by 7pm, no snacking after  - keep a food diary and bring to follow-up (include beverages; e.g. Gatorade Zero has no calories but significant source of sodium)              Specific tips and techniques to facilitate compliance with above recommendations were provided and discussed. Celestino Evans verbalized understanding. The patient was encouraged to contact me as needed.       Follow-up: recommended and scheduled in 3 weeks              Erika Trujillo RD, [facility]

## 2021-05-24 ENCOUNTER — HOSPITAL ENCOUNTER (OUTPATIENT)
Dept: CARDIAC REHAB | Age: 36
Discharge: HOME | End: 2021-05-24
Attending: NURSE PRACTITIONER
Payer: MEDICAID

## 2021-05-24 PROCEDURE — 97803 MED NUTRITION INDIV SUBSEQ: CPT | Performed by: DIETITIAN, REGISTERED

## 2021-05-24 NOTE — TELEMEDICINE
Kary Charles  was informed of the inherent limitations of a virtual visit,  and has consented to a virtual therapy visit on 2021. Information regarding emergency contact information for this patient during this visit is to contact:  Ramon Roa at [phone] in addition to calling 911. The patient was informed that at any time during the virtual visit, they can decide to stop the virtual visit. The patient verified that they are physically located in the [facility] for this virtual visit. [address] [facility] NUTRITION CLINIC: FOLLOW-UP NOTE  DATE: 2021      REFERRING PHYSICIAN: Dr. Aubree Erwin    NAME: Kary Charles  : 1985 AGE: 43 y.o. GENDER: male    DIAGNOSIS ASSOCIATED WITH VISIT: Hypertension & BMI>30    RELEVANT COMORBIDITIES:  H/o stroke; see previous note for complete medical history    LABS:   Lab Results   Component Value Date/Time    Cholesterol, total 179 10/03/2017 12:34 PM    HDL Cholesterol 50 10/03/2017 12:34 PM    LDL, calculated 107 (H) 10/03/2017 12:34 PM    VLDL, calculated 22 10/03/2017 12:34 PM    Triglyceride 112 10/03/2017 12:34 PM    CHOL/HDL Ratio 4.6 2016 04:31 AM     No results found for: HBA1C, AQP9ICMM, RYR0ZJCN    Reports BP at home yesterday 129/89     MEDICATIONS/SUPPLEMENTS:   [unfilled]  Prior to Admission medications    Medication Sig Start Date End Date Taking? Authorizing Provider   metoprolol succinate (TOPROL-XL) 50 mg XL tablet 1 tab(s) 20   Provider, Historical   simvastatin (ZOCOR) 10 mg tablet Take 10 mg by mouth nightly. 20   Provider, Historical   gabapentin (NEURONTIN) 300 mg capsule TAKE 1 CAPSULE BY MOUTH TWICE DAILY 20   Provider, Historical   baclofen 5 mg tab 5 mg three (3) times daily. Provider, Historical   escitalopram oxalate (LEXAPRO) 20 mg tablet Take 20 mg by mouth daily.  18   Provider, Historical   amLODIPine (NORVASC) 5 mg tablet TAKE 1 TABLET EVERY DAY  Patient taking differently: 2.5 mg. TAKE 1 TABLET EVERY DAY 7/12/17   Ana Lugo MD       EXERCISE/PHYSICAL ACTIVITY: walks laps around his house \"all day every day,\" does abdominal crunches and home physical therapy; ambulates with a cane post stroke    ALCOHOL / TOBACCO USE: one shot 3 times per week; quit smoking a year ago    SOCIAL HISTORY: on disability but taking steps to work towards a return to work; currently lives with his sister and her two children; relies on others for transportation        ANTHROPOMETRICS:    Ht Readings from Last 1 Encounters:   05/03/21 5' 6\" (1.676 m)      Wt Readings from Last 10 Encounters:   05/03/21 86.6 kg (191 lb)   04/19/21 86.6 kg (191 lb)   10/12/20 84.4 kg (186 lb)   03/06/20 75.3 kg (166 lb)   10/03/17 68.9 kg (151 lb 12.8 oz)   07/03/17 71.2 kg (157 lb)   06/19/17 74.8 kg (165 lb)   04/03/17 76.7 kg (169 lb)   12/02/16 77.1 kg (170 lb)   10/26/16 74.8 kg (165 lb)     Patient's Reported Weight on Home Scale:  5/24/21 185 lbs    IBW:142 # +/- 10%  %IBW: 130 % +/- 10%    BMI: 29.9 Category: Overweight-Obese          NUTRITION ASSESSMENT:    FOOD ALLERGIES/INTOLERANCES: none       24 Hour Diet Recall  Breakfast     Snack     Lunch     Snack  2 apples, [employer] crab chips (about 2 handfuls)   Dinner  8 pm remy cheese steak   Snack       Mr. Francis Charles states yesterday was a \"cheat\" day. He states he mostly has been eating fish or chicken sandwiches but this past week ate stir-mckeon daily (chicken with broccoli & bell peppers with soy sauce and spices). He admits he continues to eat one meal a day and late at night. However, his recall and verbal account indicates he did make progress with eating more non-starchy vegetables and drinking only water vs Gatorade. He states he has been reading labels for sodium but did not think to check the soy sauce.        INITIAL NUTRITION DIAGNOSIS:  Food and nutrition related knowledge deficit related to  Lack of nutrition education as evidenced by pt request for RD education and counseling     SECONDARY NUTRITION DIAGNOSIS:  Limited adherence to nutrition recommendations related to nutrition knowledge deficit and contemplative stage of change as evidenced by food choices and eating pattern not aligned with recommended nutrition prescription. NUTRITION INTERVENTION:  Nutrition 30  minute follow-up one-on-one education & goal setting with [address]    [facility]  previous goals and plans established at last visit, any real or perceived barriers to obtaining the goal / adhering to plan, and progress towards goal made. Collaborated with patient to establish or revise specific nutrient goals as well as specific food / behavior changes that will help patient meet the overall goal of: weight loss and BP management    NUTRITION EDUCATION / HANDOUTS PROVIDED:  - Recipes        PATIENT GOALS:    Weight Goals:    Short Term Weight Goal: 180-185 lbs on clinic scale at next visit  Long Term Weight Goal: 160 lbs      Nutrition Goals:     Daily Recommendations:  Calories: 1800 /day   Sodium: no more than 1500 mg/day  Fruit: 2-2.5 cups / day  Vegetables: half the plate at meals        - read and compare food labels for calories and sodium  - use Ensure or a Healthy Choice frozen entree for a meal (rather than skip, snack on chips, or eat restaurant food)  - eat dinner by 7pm, no snacking after  - keep a food diary using [employer]      Specific tips and techniques to facilitate compliance with above recommendations were provided and discussed. Celestino Evans verbalized understanding. The patient was encouraged to contact me as needed. Follow-up scheduled Monday 5/24/21 at 1:00 pm               Ha Garcia RD, CDE  Kary Singer is a 43 y.o. male being evaluated by a Virtual Visit (video visit) encounter to address concerns as mentioned above. A caregiver was present when appropriate.  Due to this being a TeleHealth encounter (During QJPEP-86 public health emergency), evaluation of the following areas was limited: Nutrition Focused Physical Exam. Pursuant to the emergency declaration under the [facility], [facility] waiver authority and the Coronavirus Preparedness and [employer] Act, this Virtual Visit was conducted with patient's (and/or legal guardian's) consent, to reduce the risk of exposure to COVID-19 and provide necessary medical care. Services were provided through a video synchronous discussion virtually to substitute for in-person encounter. --Alphonso Grande RD on 5/24/2021 at 11:42 AM    An electronic signature was used to authenticate this note.

## 2021-06-21 ENCOUNTER — TELEPHONE (OUTPATIENT)
Dept: CARDIAC REHAB | Age: 36
End: 2021-06-21

## 2021-06-21 NOTE — TELEPHONE ENCOUNTER
TELEPHONE ENCOUNTER: OP NUTRITION APPOINTMENT     Reminder call placed on 6/21/2021    Outcome:                 [] Patient confirmed appointment              [] Patient rescheduled appointment              [] Unable to reach              [] Left message              [x] Other:       Patient originally confirmed appointment last week but called back this morning to state he had arranged his transportation for the wrong day. A later appointment time for today was offered and patient was going to try and find transportation. If he is unable to keep today's appointment he will call back to reschedule.       Karla Jones RD

## 2021-06-30 ENCOUNTER — TRANSCRIBE ORDER (OUTPATIENT)
Dept: SCHEDULING | Age: 36
End: 2021-06-30

## 2021-06-30 DIAGNOSIS — L98.9 DISORDER OF SKIN AND SUBCUTANEOUS TISSUE: Primary | ICD-10-CM

## 2021-07-09 ENCOUNTER — HOSPITAL ENCOUNTER (OUTPATIENT)
Dept: ULTRASOUND IMAGING | Age: 36
Discharge: HOME | End: 2021-07-09
Attending: NURSE PRACTITIONER
Payer: MEDICAID

## 2021-07-09 DIAGNOSIS — L98.9 DISORDER OF SKIN AND SUBCUTANEOUS TISSUE: ICD-10-CM

## 2021-07-09 PROCEDURE — 76700 US EXAM ABDOM COMPLETE: CPT

## 2021-10-18 ENCOUNTER — APPOINTMENT (OUTPATIENT)
Dept: GENERAL RADIOLOGY | Age: 36
End: 2021-10-18
Attending: PHYSICIAN ASSISTANT
Payer: MEDICAID

## 2021-10-18 ENCOUNTER — HOSPITAL ENCOUNTER (EMERGENCY)
Age: 36
Discharge: HOME | End: 2021-10-18
Attending: STUDENT IN AN ORGANIZED HEALTH CARE EDUCATION/TRAINING PROGRAM
Payer: MEDICAID

## 2021-10-18 VITALS
BODY MASS INDEX: 29.51 KG/M2 | OXYGEN SATURATION: 100 % | HEART RATE: 81 BPM | HEIGHT: 66 IN | SYSTOLIC BLOOD PRESSURE: 127 MMHG | RESPIRATION RATE: 16 BRPM | TEMPERATURE: 98.7 F | DIASTOLIC BLOOD PRESSURE: 86 MMHG | WEIGHT: 183.64 LBS
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
## 2021-10-18 DIAGNOSIS — S42.402A CLOSED FRACTURE OF LEFT ELBOW, INITIAL ENCOUNTER: Primary | ICD-10-CM

## 2021-10-18 PROCEDURE — 73090 X-RAY EXAM OF FOREARM: CPT

## 2021-10-18 PROCEDURE — 73130 X-RAY EXAM OF HAND: CPT

## 2021-10-18 PROCEDURE — 99281 EMR DPT VST MAYX REQ PHY/QHP: CPT

## 2021-10-18 NOTE — DISCHARGE INSTRUCTIONS
It was a pleasure taking care of you at [facility] Emergency Department today. We know that when you come to [facility], you are entrusting us with your health, comfort, and safety. Our physicians and nurses honor that trust, and we truly appreciate the opportunity to care for you and your loved ones. We also value your feedback. If you receive a survey about your Emergency Department experience today, please fill it out. We care about our patients' feedback, and we listen to what you have to say. Thank you!

## 2021-10-18 NOTE — ED PROVIDER NOTES
EMERGENCY DEPARTMENT HISTORY AND PHYSICAL EXAM      Date: 10/18/2021  Patient Name: Adenike Ayala    History of Presenting Illness     Chief Complaint   Patient presents with    Arm Pain     Right arm pain with swelling for 1 week. Radial pulse present. HX of CVA with right defecits    Fall     Reports GLF on Tuesday onto right arm, denies other injury       History Provided By: Patient    HPI: Adenike Ayala, 43 y.o. male with significant PMHx for CVA in 2016, presents by POV to the ED with cc of pain to the right forearm from a fall that occurred one week ago. He notes that he was in an argument with his sister over his cell phone when he lost his balance and fell backwards. He had immediate onset of pain that has been worsening over the course of the week. The pain is constant, non-radiating and worsens with movement. He does have chronic weakness in the right arm from a previous stroke. This is his first evaluation for this complaint and he denies all other injuries from the fall. There are no other complaints, changes, or physical findings at this time. Social Hx: Tobacco (former smoker; quit 1.5 years ago), EtOH (social), Illicit drug use (denies)     PCP: Kelechi Hein MD    No current facility-administered medications on file prior to encounter. Current Outpatient Medications on File Prior to Encounter   Medication Sig Dispense Refill    metoprolol succinate (TOPROL-XL) 50 mg XL tablet 1 tab(s)      simvastatin (ZOCOR) 10 mg tablet Take 10 mg by mouth nightly.  gabapentin (NEURONTIN) 300 mg capsule TAKE 1 CAPSULE BY MOUTH TWICE DAILY      baclofen 5 mg tab 5 mg three (3) times daily.  escitalopram oxalate (LEXAPRO) 20 mg tablet Take 20 mg by mouth daily.       amLODIPine (NORVASC) 5 mg tablet TAKE 1 TABLET EVERY DAY (Patient taking differently: 2.5 mg. TAKE 1 TABLET EVERY DAY) 90 Tab 0       Past History     Past Medical History:  Past Medical History:   Diagnosis Date    Anxiety     Chronic kidney disease     patient unsure    Hypertension     Stroke [facility]) 2014 & 2016       Past Surgical History:  Past Surgical History:   Procedure Laterality Date    HX ORTHOPAEDIC      rupture tendon    PLACE PERCUT GASTROSTOMY TUBE  2016            Family History:  Family History   Problem Relation Age of Onset    Lupus Mother     Cancer Paternal Uncle     Stroke Paternal Grandmother     Heart Disease Father     Drug Abuse Father     Liver Disease Father         hepatitis    Other Sister         short leg    Hypertension Sister        Social History:  Social History     Tobacco Use    Smoking status: Former Smoker     Packs/day: 0.00     Years: 10.00     Pack years: 0.00     Quit date: 10/2019     Years since quittin.0    Smokeless tobacco: Never Used   Substance Use Topics    Alcohol use: Not Currently     Alcohol/week: 2.5 standard drinks     Types: 3 Standard drinks or equivalent per week     Comment: occasional    Drug use: No       Allergies:  No Known Allergies      Review of Systems   Review of Systems   Constitutional: Negative for chills, diaphoresis and fever. HENT: Negative for congestion, ear pain, rhinorrhea and sore throat. Respiratory: Negative for cough and shortness of breath. Cardiovascular: Negative for chest pain. Gastrointestinal: Negative for abdominal pain, constipation, diarrhea, nausea and vomiting. Genitourinary: Negative for difficulty urinating, dysuria, frequency and hematuria. Musculoskeletal: Positive for arthralgias. Negative for myalgias. Neurological: Negative for headaches. All other systems reviewed and are negative. Physical Exam   Physical Exam  Vitals and nursing note reviewed. Constitutional:       General: He is not in acute distress. Appearance: He is well-developed. He is not diaphoretic. Comments: 43 y.o. -American male    HENT:      Head: Normocephalic and atraumatic.    Eyes: General:         Right eye: No discharge. Left eye: No discharge. Conjunctiva/sclera: Conjunctivae normal.   Cardiovascular:      Rate and Rhythm: Normal rate and regular rhythm. Heart sounds: Normal heart sounds. No murmur heard. Pulmonary:      Effort: Pulmonary effort is normal. No respiratory distress. Breath sounds: Normal breath sounds. Musculoskeletal:      Cervical back: Normal range of motion and neck supple. Comments: Right forearm with swelling and ecchymosis. Tender palpation of the olecranon and minimally to the forearm. Range of motion very limited in the fingers, wrist, and elbow. The patient notes that this is his baseline secondary to his prior CVA. Distal neurovascular status intact. Cap refill less than 2 seconds. Ambulatory with use of a cane. Skin:     General: Skin is warm and dry. Neurological:      Mental Status: He is alert and oriented to person, place, and time. Psychiatric:         Behavior: Behavior normal.         Diagnostic Study Results     Labs - none    Radiologic Studies -   XR FOREARM RT AP/LAT   Final Result   Proximal ulnar fracture. XR HAND RT MIN 3 V   Final Result   No acute abnormality. The above xray(s) have been interpreted independently by me and I agree with the radiologists findings above. Medical Decision Making   I am the first provider for this patient. I reviewed the vital signs, available nursing notes, past medical history, past surgical history, family history and social history. Vital Signs-Reviewed the patient's vital signs. Patient Vitals for the past 12 hrs:   Temp Pulse Resp BP SpO2   10/18/21 1227 98.7 °F (37.1 °C) 81 16 127/86 100 %       Records Reviewed: Nursing Notes and Old Medical Records    Provider Notes (Medical Decision Making):   Patient presents ED with stable vital signs for an arm injury from a fall that occurred a week ago.   Differential diagnosis include fracture, sprain, strain, contusion. X-rays show an acute fracture of the proximal ulna. Patient splinted. He is to follow-up with orthopedics. If he worsens he can come back to the ED. ED Course:   Initial assessment performed. The patients presenting problems have been discussed, and they are in agreement with the care plan formulated and outlined with them. I have encouraged them to ask questions as they arise throughout their visit. Procedure Note - Splint Assessment:  Performed by: Jeremy Whitlock ED tech  Splint to patient's right arm was evaluated by ROSEMARY Sweeney. Splint in good position. N/V intact after treatment. The procedure took 1-15 minutes, and patient tolerated well. Critical Care Time: None    Disposition:  DISCHARGE NOTE:  The pt is ready for discharge. The pt's signs, symptoms, diagnosis, and discharge instructions have been discussed and pt has conveyed their understanding. The pt is to follow up as recommended or return to ER should their symptoms worsen. Plan has been discussed and pt is in agreement. PLAN:  1. Discharge Medication List as of 10/18/2021  1:46 PM        2. Follow-up Information     Follow up With Specialties Details Why Contact Info    Alfonzo Torres, DO Orthopedic Surgery In 3 days  [address] [facility] [address]  [address] [id number]      [facility] EMERGENCY DEPT Emergency Medicine  If symptoms worsen [address] [place] [address] [facility]  [phone]        Return to ED if worse     Diagnosis     Clinical Impression:   1. Closed fracture of left elbow, initial encounter    2. Fall, initial encounter          Please note that this dictation was completed with [employer], the computer voice recognition software. Quite often unanticipated grammatical, syntax, homophones, and other interpretive errors are inadvertently transcribed by the computer software. Please disregards these errors. Please excuse any errors that have escaped final proofreading.

## 2022-03-18 PROBLEM — I61.0 NONTRAUMATIC SUBCORTICAL HEMORRHAGE OF RIGHT CEREBRAL HEMISPHERE (HCC): Status: ACTIVE | Noted: 2020-03-06

## 2022-03-19 PROBLEM — Z86.73 HISTORY OF STROKE: Status: ACTIVE | Noted: 2020-03-06

## 2022-03-19 PROBLEM — R47.01 APHASIA: Status: ACTIVE | Noted: 2020-03-06

## 2022-03-19 PROBLEM — I65.23 BILATERAL CAROTID ARTERY STENOSIS: Status: ACTIVE | Noted: 2020-03-06

## 2023-05-12 RX ORDER — BACLOFEN 5 MG/1
TABLET ORAL 3 TIMES DAILY
COMMUNITY
End: 2023-06-05 | Stop reason: ALTCHOICE

## 2023-05-12 RX ORDER — SIMVASTATIN 10 MG
10 TABLET ORAL NIGHTLY
COMMUNITY
Start: 2020-02-29

## 2023-05-12 RX ORDER — ESCITALOPRAM OXALATE 20 MG/1
20 TABLET ORAL DAILY
COMMUNITY
Start: 2018-12-05

## 2023-05-12 RX ORDER — AMLODIPINE BESYLATE 5 MG/1
1 TABLET ORAL DAILY
COMMUNITY
Start: 2017-07-12

## 2023-05-12 RX ORDER — GABAPENTIN 300 MG/1
1 CAPSULE ORAL 2 TIMES DAILY
COMMUNITY
Start: 2020-02-01

## 2023-05-12 RX ORDER — METOPROLOL SUCCINATE 50 MG/1
TABLET, EXTENDED RELEASE ORAL
COMMUNITY
Start: 2020-09-21

## 2023-06-05 ENCOUNTER — HOSPITAL ENCOUNTER (EMERGENCY)
Facility: HOSPITAL | Age: 38
Discharge: HOME | End: 2023-06-05
Payer: MEDICARE

## 2023-06-05 ENCOUNTER — APPOINTMENT (OUTPATIENT)
Facility: HOSPITAL | Age: 38
End: 2023-06-05
Payer: MEDICARE

## 2023-06-05 VITALS
RESPIRATION RATE: 18 BRPM | SYSTOLIC BLOOD PRESSURE: 138 MMHG | HEART RATE: 77 BPM | DIASTOLIC BLOOD PRESSURE: 92 MMHG | WEIGHT: 185.85 LBS | OXYGEN SATURATION: 98 % | HEIGHT: 66 IN | TEMPERATURE: 97.9 F | BODY MASS INDEX: 29.87 KG/M2

## 2023-06-05 DIAGNOSIS — R52 DIFFUSE PAIN: ICD-10-CM

## 2023-06-05 DIAGNOSIS — N39.0 URINARY TRACT INFECTION WITHOUT HEMATURIA, SITE UNSPECIFIED: ICD-10-CM
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
## 2023-06-05 LAB
ALBUMIN SERPL-MCNC: 3.5 G/DL (ref 3.5–5)
ALBUMIN/GLOB SERPL: 0.9 (ref 1.1–2.2)
ALP SERPL-CCNC: 120 U/L (ref 45–117)
ALT SERPL-CCNC: 37 U/L (ref 12–78)
ANION GAP SERPL CALC-SCNC: 6 MMOL/L (ref 5–15)
APPEARANCE UR: CLEAR
AST SERPL-CCNC: 18 U/L (ref 15–37)
BACTERIA URNS QL MICRO: NEGATIVE /HPF
BASOPHILS # BLD: 0.1 K/UL (ref 0–0.1)
BASOPHILS NFR BLD: 1 % (ref 0–1)
BILIRUB SERPL-MCNC: 0.9 MG/DL (ref 0.2–1)
BILIRUB UR QL: NEGATIVE
BUN SERPL-MCNC: 15 MG/DL (ref 6–20)
BUN/CREAT SERPL: 10 (ref 12–20)
CALCIUM SERPL-MCNC: 8.9 MG/DL (ref 8.5–10.1)
CHLORIDE SERPL-SCNC: 106 MMOL/L (ref 97–108)
CO2 SERPL-SCNC: 26 MMOL/L (ref 21–32)
COLOR UR: ABNORMAL
CREAT SERPL-MCNC: 1.52 MG/DL (ref 0.7–1.3)
DIFFERENTIAL METHOD BLD: ABNORMAL
EOSINOPHIL # BLD: 0.3 K/UL (ref 0–0.4)
EOSINOPHIL NFR BLD: 3 % (ref 0–7)
EPITH CASTS URNS QL MICRO: ABNORMAL /LPF
ERYTHROCYTE [DISTWIDTH] IN BLOOD BY AUTOMATED COUNT: 13.3 % (ref 11.5–14.5)
GLOBULIN SER CALC-MCNC: 3.9 G/DL (ref 2–4)
GLUCOSE SERPL-MCNC: 91 MG/DL (ref 65–100)
GLUCOSE UR STRIP.AUTO-MCNC: NEGATIVE MG/DL
HCT VFR BLD AUTO: 46.1 % (ref 36.6–50.3)
HGB BLD-MCNC: 16.2 G/DL (ref 12.1–17)
HGB UR QL STRIP: NEGATIVE
HYALINE CASTS URNS QL MICRO: ABNORMAL /LPF (ref 0–2)
IMM GRANULOCYTES # BLD AUTO: 0 K/UL (ref 0–0.04)
IMM GRANULOCYTES NFR BLD AUTO: 0 % (ref 0–0.5)
KETONES UR QL STRIP.AUTO: NEGATIVE MG/DL
LEUKOCYTE ESTERASE UR QL STRIP.AUTO: ABNORMAL
LYMPHOCYTES # BLD: 1.8 K/UL (ref 0.8–3.5)
LYMPHOCYTES NFR BLD: 16 % (ref 12–49)
MCH RBC QN AUTO: 34.1 PG (ref 26–34)
MCHC RBC AUTO-ENTMCNC: 35.1 G/DL (ref 30–36.5)
MCV RBC AUTO: 97.1 FL (ref 80–99)
MONOCYTES # BLD: 0.9 K/UL (ref 0–1)
MONOCYTES NFR BLD: 8 % (ref 5–13)
NEUTS SEG # BLD: 8.4 K/UL (ref 1.8–8)
NEUTS SEG NFR BLD: 72 % (ref 32–75)
NITRITE UR QL STRIP.AUTO: NEGATIVE
NRBC # BLD: 0 K/UL (ref 0–0.01)
NRBC BLD-RTO: 0 PER 100 WBC
PH UR STRIP: 7 (ref 5–8)
PLATELET # BLD AUTO: 360 K/UL (ref 150–400)
PMV BLD AUTO: 9.3 FL (ref 8.9–12.9)
POTASSIUM SERPL-SCNC: 3.8 MMOL/L (ref 3.5–5.1)
PROT SERPL-MCNC: 7.4 G/DL (ref 6.4–8.2)
PROT UR STRIP-MCNC: NEGATIVE MG/DL
RBC # BLD AUTO: 4.75 M/UL (ref 4.1–5.7)
RBC #/AREA URNS HPF: ABNORMAL /HPF (ref 0–5)
SODIUM SERPL-SCNC: 138 MMOL/L (ref 136–145)
SP GR UR REFRACTOMETRY: <1.005 (ref 1–1.03)
TROPONIN I SERPL HS-MCNC: 7 NG/L (ref 0–76)
URINE CULTURE IF INDICATED: ABNORMAL
UROBILINOGEN UR QL STRIP.AUTO: 1 EU/DL (ref 0.2–1)
WBC # BLD AUTO: 11.5 K/UL (ref 4.1–11.1)
WBC URNS QL MICRO: ABNORMAL /HPF (ref 0–4)

## 2023-06-05 PROCEDURE — 85025 COMPLETE CBC W/AUTO DIFF WBC: CPT

## 2023-06-05 PROCEDURE — 81001 URINALYSIS AUTO W/SCOPE: CPT

## 2023-06-05 PROCEDURE — 87077 CULTURE AEROBIC IDENTIFY: CPT

## 2023-06-05 PROCEDURE — 84484 ASSAY OF TROPONIN QUANT: CPT

## 2023-06-05 PROCEDURE — 36415 COLL VENOUS BLD VENIPUNCTURE: CPT

## 2023-06-05 PROCEDURE — 71260 CT THORAX DX C+: CPT

## 2023-06-05 PROCEDURE — 73590 X-RAY EXAM OF LOWER LEG: CPT

## 2023-06-05 PROCEDURE — 80053 COMPREHEN METABOLIC PANEL: CPT

## 2023-06-05 PROCEDURE — 72125 CT NECK SPINE W/O DYE: CPT

## 2023-06-05 PROCEDURE — 6360000004 HC RX CONTRAST MEDICATION: Performed by: PHYSICIAN ASSISTANT

## 2023-06-05 PROCEDURE — 99285 EMERGENCY DEPT VISIT HI MDM: CPT

## 2023-06-05 PROCEDURE — 87186 SC STD MICRODIL/AGAR DIL: CPT

## 2023-06-05 PROCEDURE — 93005 ELECTROCARDIOGRAM TRACING: CPT | Performed by: PHYSICIAN ASSISTANT

## 2023-06-05 PROCEDURE — 70450 CT HEAD/BRAIN W/O DYE: CPT

## 2023-06-05 PROCEDURE — 73552 X-RAY EXAM OF FEMUR 2/>: CPT

## 2023-06-05 PROCEDURE — 74177 CT ABD & PELVIS W/CONTRAST: CPT

## 2023-06-05 PROCEDURE — 73060 X-RAY EXAM OF HUMERUS: CPT

## 2023-06-05 PROCEDURE — 87086 URINE CULTURE/COLONY COUNT: CPT

## 2023-06-05 RX ORDER — ORPHENADRINE CITRATE 100 MG/1
100 TABLET, EXTENDED RELEASE ORAL 2 TIMES DAILY
Qty: 20 TABLET | Refills: 0 | Status: SHIPPED | OUTPATIENT
Start: 2023-06-05 | End: 2023-06-06

## 2023-06-05 RX ORDER — CEFDINIR 300 MG/1
300 CAPSULE ORAL 2 TIMES DAILY
Qty: 10 CAPSULE | Refills: 0 | Status: SHIPPED | OUTPATIENT
Start: 2023-06-05 | End: 2023-06-10

## 2023-06-05 RX ADMIN — IOPAMIDOL 100 ML: 755 INJECTION, SOLUTION INTRAVENOUS at 20:20

## 2023-06-05 ASSESSMENT — LIFESTYLE VARIABLES
HOW OFTEN DO YOU HAVE A DRINK CONTAINING ALCOHOL: NEVER
HOW MANY STANDARD DRINKS CONTAINING ALCOHOL DO YOU HAVE ON A TYPICAL DAY: PATIENT DOES NOT DRINK

## 2023-06-05 ASSESSMENT — PAIN - FUNCTIONAL ASSESSMENT: PAIN_FUNCTIONAL_ASSESSMENT: 0-10

## 2023-06-05 ASSESSMENT — PAIN SCALES - GENERAL: PAINLEVEL_OUTOF10: 0

## 2023-06-05 NOTE — ED PROVIDER NOTES
gabapentin 300 MG capsule  Commonly known as: NEURONTIN     metoprolol succinate 50 MG extended release tablet  Commonly known as: TOPROL XL     simvastatin 10 MG tablet  Commonly known as: ZOCOR               Where to Get Your Medications        These medications were sent to [facility] [place] [address] [phone] [place] [phone]  [address] [place] [address]      Phone: [phone]   cefdinir 300 MG capsule  orphenadrine 100 MG extended release tablet           DISCONTINUED MEDICATIONS:  Current Discharge Medication List          I have seen and evaluated the patient. My supervision physician was available for consultation. I am the Primary Clinician of Record. Prabha Sanders PA-C (electronically signed)    (Please note that parts of this dictation were completed with voice recognition software. Quite often unanticipated grammatical, syntax, homophones, and other interpretive errors are inadvertently transcribed by the computer software. Please disregards these errors.  Please excuse any errors that have escaped final proofreading.)        Prabha Sanders PA-C  06/05/23 [id number]

## 2023-06-06 LAB
EKG ATRIAL RATE: 67 BPM
EKG DIAGNOSIS: NORMAL
EKG P AXIS: 59 DEGREES
EKG P-R INTERVAL: 162 MS
EKG Q-T INTERVAL: 422 MS
EKG QRS DURATION: 68 MS
EKG QTC CALCULATION (BAZETT): 445 MS
EKG R AXIS: 42 DEGREES
EKG T AXIS: 36 DEGREES
EKG VENTRICULAR RATE: 67 BPM

## 2023-06-06 RX ORDER — CYCLOBENZAPRINE HCL 10 MG
10 TABLET ORAL 3 TIMES DAILY PRN
Qty: 15 TABLET | Refills: 0 | Status: SHIPPED | OUTPATIENT
Start: 2023-06-06

## 2023-06-06 NOTE — ED NOTES
Pt ambulating with no assist, with cane. Pt has weakness on right side from previous stroke, therefore gait is uneven, but pt is steady ambulating.      Tata Ayala RN  06/05/23 [id number]

## 2023-06-06 NOTE — DISCHARGE INSTRUCTIONS
Your kidney function is slightly off here this evening. This needs to be rechecked with your primary care physician in the next few days. If you have worsening of symptoms such as an increase in pain, fever, vomiting, anything worsening please return here.

## 2023-06-06 NOTE — ED NOTES
Pt discharged by provider. All questions answered and pt expresses understanding of instructions. Pt ambulatory out of ER with with cane and reported his walking is at his baseline. Pt refused wheelchair and stated he wanted to walk  Sling applied per md orders.        Toni Lott RN  06/05/23 7775

## 2023-06-08 LAB
BACTERIA SPEC CULT: ABNORMAL
BACTERIA SPEC CULT: ABNORMAL
CC UR VC: ABNORMAL
SERVICE CMNT-IMP: ABNORMAL

## 2024-05-15 ENCOUNTER — TELEPHONE (OUTPATIENT)
Age: 39
End: 2024-05-15